# Patient Record
Sex: MALE | Race: WHITE | NOT HISPANIC OR LATINO | Employment: OTHER | URBAN - METROPOLITAN AREA
[De-identification: names, ages, dates, MRNs, and addresses within clinical notes are randomized per-mention and may not be internally consistent; named-entity substitution may affect disease eponyms.]

---

## 2017-01-18 ENCOUNTER — OFFICE VISIT (OUTPATIENT)
Dept: CARDIOLOGY | Facility: CLINIC | Age: 82
End: 2017-01-18

## 2017-01-18 VITALS
HEART RATE: 51 BPM | HEIGHT: 71 IN | SYSTOLIC BLOOD PRESSURE: 120 MMHG | DIASTOLIC BLOOD PRESSURE: 68 MMHG | WEIGHT: 200.2 LBS | BODY MASS INDEX: 28.03 KG/M2

## 2017-01-18 DIAGNOSIS — E78.5 DYSLIPIDEMIA: ICD-10-CM

## 2017-01-18 DIAGNOSIS — I25.10 CORONARY ARTERY DISEASE INVOLVING NATIVE CORONARY ARTERY OF NATIVE HEART WITHOUT ANGINA PECTORIS: Primary | ICD-10-CM

## 2017-01-18 DIAGNOSIS — I10 ESSENTIAL HYPERTENSION: ICD-10-CM

## 2017-01-18 PROCEDURE — 93000 ELECTROCARDIOGRAM COMPLETE: CPT | Performed by: INTERNAL MEDICINE

## 2017-01-18 PROCEDURE — 99213 OFFICE O/P EST LOW 20 MIN: CPT | Performed by: INTERNAL MEDICINE

## 2017-01-18 NOTE — PROGRESS NOTES
Subjective:     Encounter Date:01/18/2017      Patient ID: Nickolas rDake is a 82 y.o. male.    Chief Complaint:  History of Present Illness      The patient is an 82-year-old man with a history of hypertension, dyslipidemia, coronary artery disease status post prior anterolateral ST-elevation myocardial infarction and drug-eluting stent placement of his mid left anterior descending artery who presents for followup.  I saw the patient initially when he presented in 12/2013 with an anterolateral ST elevation myocardial infarction.  His cardiac catheterization showed his mid LAD to have 95% thrombotic stenosis and he underwent a drug-eluting stent placement with a Xience Xpedition 2.5 x 23 mm and 2.5 x 12 mm stents.  His initial echocardiogram after his stent placement revealed septal wall hypokinesis but otherwise preserved left ventricular systolic function.  He had a repeat echocardiogram during his followup which revealed resolution of the wall motion abnormalities.  He was on Brilinta for about a year and stopped this in 01/2015.  Overall he has done well.      Today, he presents for his annual followup.  He continues to feel well overall.  He denies any chest pain, shortness of breath, PND, orthopnea, presyncope or syncope.  He denies any palpitations or lower extremity edema.  He has been compliant with all his medications and has been doing well with them.         Review of Systems   Constitution: Negative for weakness and malaise/fatigue.   HENT: Negative for headaches, hearing loss, hoarse voice, nosebleeds and sore throat.    Eyes: Negative for pain.   Cardiovascular: Negative for chest pain, claudication, cyanosis, dyspnea on exertion, irregular heartbeat, leg swelling, near-syncope, orthopnea, palpitations, paroxysmal nocturnal dyspnea and syncope.   Respiratory: Negative for shortness of breath and snoring.    Endocrine: Negative for cold intolerance, heat intolerance, polydipsia, polyphagia and  polyuria.   Skin: Negative for itching and rash.   Musculoskeletal: Negative for arthritis, falls, joint pain, joint swelling, muscle cramps, muscle weakness and myalgias.   Gastrointestinal: Negative for constipation, diarrhea, dysphagia, heartburn, hematemesis, hematochezia, melena, nausea and vomiting.   Genitourinary: Negative for frequency, hematuria and hesitancy.   Neurological: Negative for excessive daytime sleepiness, dizziness, light-headedness and numbness.   Psychiatric/Behavioral: Negative for depression. The patient is not nervous/anxious.           Current Outpatient Prescriptions:   •  aspirin 81 MG tablet, Take 1 tablet by mouth daily., Disp: , Rfl:   •  atorvastatin (LIPITOR) 20 MG tablet, Take 1 tablet by mouth daily., Disp: , Rfl:   •  metoprolol tartrate (LOPRESSOR) 25 MG tablet, Take 1 tablet by mouth 2 (two) times a day., Disp: , Rfl:   •  Omega-3 Fatty Acids (FISH OIL) 1000 MG capsule capsule, Take 1 capsule by mouth daily., Disp: , Rfl:     Past Medical History   Diagnosis Date   • Atherosclerotic heart disease      s/p anterior ST elevation MI 12/12/2013: mid LAD Xience xpedition 2.5 x 23, 2.5 x 12 mm stent. Proximal LAD 20% stenosis. Mid LCx 20-30% stenosis. Mid RCA 20% stenosis.   • Health care maintenance    • Hypertension    • Ischemic cardiomyopathy    • ST elevation (STEMI) myocardial infarction      History reviewed. No pertinent past surgical history.  Family History   Problem Relation Age of Onset   • No Known Problems Mother    • No Known Problems Father      Social History   Substance Use Topics   • Smoking status: Current Every Day Smoker   • Smokeless tobacco: None      Comment: 4 or less cig/day   • Alcohol use Yes      Comment: rare  / caffeine use           ECG 12 Lead  Date/Time: 1/18/2017 3:13 PM  Performed by: MULUGETA [place]  Authorized by: MULUGETA [place]   Comparison: compared with previous ECG   Similar to previous ECG  Rhythm: sinus bradycardia  Ectopy: atrial  "premature contractions               Objective:         Visit Vitals   • /68   • Pulse 51   • Ht 71\" (180.3 cm)   • Wt 200 lb 3.2 oz (90.8 kg)   • BMI 27.92 kg/m2          Physical Exam   Constitutional: He is oriented to person, place, and time. He appears well-developed and well-nourished.   HENT:   Head: Normocephalic and atraumatic.   Eyes: Conjunctivae, EOM and lids are normal. Pupils are equal, round, and reactive to light.   Neck: Normal range of motion and full passive range of motion without pain. Neck supple. No JVD present. Carotid bruit is not present.   Cardiovascular: Normal rate, regular rhythm, S1 normal and S2 normal.  Exam reveals no gallop.    No murmur heard.  Pulses:       Radial pulses are 2+ on the right side, and 2+ on the left side.   No bilateral lower extremity edema   Pulmonary/Chest: Effort normal and breath sounds normal.   Abdominal: Soft. Normal appearance.   Lymphadenopathy:     He has no cervical adenopathy.   Neurological: He is alert and oriented to person, place, and time.   Skin: Skin is warm, dry and intact.   Psychiatric: He has a normal mood and affect.       Lab Review:       Assessment:          Diagnosis Plan   1. Coronary artery disease involving native coronary artery of native heart without angina pectoris     2. Dyslipidemia     3. Essential hypertension            Plan:           1. Coronary artery disease.  He continues to remain stable and asymptomatic.  Continue his current management including beta blocker, aspirin and statin.   2. Hypertension.  His blood pressures are well controlled on low dose beta blocker. Continue the same.   3. Dyslipidemia.  He is on atorvastatin and his lipids are followed by Dr. Leija.     We will plan to followup with the patient again in one year or sooner if any issues arise prior to that.       Coronary Artery Disease  Assessment  • The patient has no angina    Plan  • Lifestyle modifications discussed include adhering to a " heart healthy diet, avoidance of tobacco products, maintenance of a healthy weight, medication compliance, regular exercise and regular monitoring of cholesterol and blood pressure    Subjective - Objective  • There has been a previous stent procedure using DAYSI 12/2013  • Current antiplatelet therapy includes aspirin 81 mg

## 2018-01-23 ENCOUNTER — OFFICE VISIT (OUTPATIENT)
Dept: CARDIOLOGY | Facility: CLINIC | Age: 83
End: 2018-01-23

## 2018-01-23 VITALS
HEART RATE: 54 BPM | BODY MASS INDEX: 27.44 KG/M2 | SYSTOLIC BLOOD PRESSURE: 98 MMHG | HEIGHT: 71 IN | WEIGHT: 196 LBS | DIASTOLIC BLOOD PRESSURE: 64 MMHG

## 2018-01-23 DIAGNOSIS — I25.10 CAD IN NATIVE ARTERY: Primary | ICD-10-CM

## 2018-01-23 DIAGNOSIS — E78.5 DYSLIPIDEMIA: ICD-10-CM

## 2018-01-23 DIAGNOSIS — I10 ESSENTIAL HYPERTENSION: ICD-10-CM

## 2018-01-23 DIAGNOSIS — E78.5 HYPERLIPIDEMIA, UNSPECIFIED HYPERLIPIDEMIA TYPE: ICD-10-CM

## 2018-01-23 DIAGNOSIS — Z95.5 S/P DRUG ELUTING CORONARY STENT PLACEMENT: ICD-10-CM

## 2018-01-23 PROBLEM — Z72.0 TOBACCO USE: Status: ACTIVE | Noted: 2018-01-23

## 2018-01-23 PROCEDURE — 99213 OFFICE O/P EST LOW 20 MIN: CPT | Performed by: INTERNAL MEDICINE

## 2018-01-23 PROCEDURE — 93000 ELECTROCARDIOGRAM COMPLETE: CPT | Performed by: INTERNAL MEDICINE

## 2018-01-23 NOTE — PROGRESS NOTES
"    Subjective:     Encounter Date:01/23/2018      Patient ID: Nickolas Drake is a 83 y.o. male.    Chief Complaint:  History of Present Illness    This is an 83-year-old with a history of hypertension, hyperlipidemia, coronary artery disease status post prior anterior lateral ST elevation MI and drug eluting stent placement of his mid left anterior descending artery, who presents for follow-up.    I began following the patient when he presented in 12/2013 with an anterolateral ST elevation MI.  His cardiac catheterizations time showed a significant mid LAD thrombotic stenosis of 95% for which he underwent drug-eluting stent placement.  Initially his echocardiogram showed septal wall hypokinesis but preserved left ventricular systolic function.  A repeat echocardiogram and follow-up showed resolution of his wall motion abnormalities and continued normal left ventricular systolic function.  He remained on Ticagrelor approximately one year following his stent placement.  In follow-up he is stent well.    Today he presents for annual follow-up.  He continues to feel well.  He denies any chest pain, shortness of breath, PND or orthopnea, presyncope or syncope, palpitations, or lower extremity edema.  He remains compliant with all his medications.  His lab work is followed routinely by Dr. Leija.  He unfortunately continues to smoke about 4-5 cigarettes a day.  When asked about quitting the patient stated \"what else does he have to do\".  He has however placed rolls himself where he cannot smoke inside his house or car and admits that the recent bad weather has limited the amount that he can smoke.    Review of Systems   Constitution: Negative for weakness and malaise/fatigue.   HENT: Negative for hearing loss, hoarse voice, nosebleeds and sore throat.    Eyes: Negative for pain.   Cardiovascular: Negative for chest pain, claudication, cyanosis, dyspnea on exertion, irregular heartbeat, leg swelling, near-syncope, " orthopnea, palpitations, paroxysmal nocturnal dyspnea and syncope.   Respiratory: Negative for shortness of breath and snoring.    Endocrine: Negative for cold intolerance, heat intolerance, polydipsia, polyphagia and polyuria.   Skin: Negative for itching and rash.   Musculoskeletal: Negative for arthritis, falls, joint pain, joint swelling, muscle cramps, muscle weakness and myalgias.   Gastrointestinal: Negative for constipation, diarrhea, dysphagia, heartburn, hematemesis, hematochezia, melena, nausea and vomiting.   Genitourinary: Negative for frequency, hematuria and hesitancy.   Neurological: Negative for excessive daytime sleepiness, dizziness, headaches, light-headedness and numbness.   Psychiatric/Behavioral: Negative for depression. The patient is not nervous/anxious.           Current Outpatient Prescriptions:   •  aspirin 81 MG tablet, Take 1 tablet by mouth daily., Disp: , Rfl:   •  atorvastatin (LIPITOR) 20 MG tablet, Take 1 tablet by mouth daily., Disp: , Rfl:   •  metoprolol tartrate (LOPRESSOR) 25 MG tablet, Take 1 tablet by mouth 2 (two) times a day., Disp: , Rfl:   •  Omega-3 Fatty Acids (FISH OIL) 1000 MG capsule capsule, Take 1 capsule by mouth daily., Disp: , Rfl:     Past Medical History:   Diagnosis Date   • Atherosclerotic heart disease     s/p anterior ST elevation MI 12/12/2013: mid LAD Xience xpedition 2.5 x 23, 2.5 x 12 mm stent. Proximal LAD 20% stenosis. Mid LCx 20-30% stenosis. Mid RCA 20% stenosis.   • Health care maintenance    • Hypertension    • Ischemic cardiomyopathy    • ST elevation (STEMI) myocardial infarction      History reviewed. No pertinent surgical history.     Family History   Problem Relation Age of Onset   • No Known Problems Mother    • No Known Problems Father      Social History   Substance Use Topics   • Smoking status: Current Every Day Smoker   • Smokeless tobacco: None      Comment: 4 or less cig/day   • Alcohol use Yes      Comment: rare  / caffeine use  "          ECG 12 Lead  Date/Time: 1/23/2018 5:13 PM  Performed by: MULUGETA [place]  Authorized by: MULUGETA [place]   Comparison: compared with previous ECG   Similar to previous ECG  Rhythm: sinus rhythm  Ectopy: atrial premature contractions               Objective:         Visit Vitals   • BP 98/64 (BP Location: Right arm, Patient Position: Sitting)   • Pulse 54   • Ht 180.3 cm (71\")   • Wt 88.9 kg (196 lb)   • BMI 27.34 kg/m2          Physical Exam   Constitutional: He is oriented to person, place, and time. He appears well-developed and well-nourished.   HENT:   Head: Normocephalic and atraumatic.   Eyes: Conjunctivae, EOM and lids are normal. Pupils are equal, round, and reactive to light.   Neck: Normal range of motion and full passive range of motion without pain. Neck supple. No JVD present. Carotid bruit is not present.   Cardiovascular: Normal rate, regular rhythm, S1 normal and S2 normal.  Exam reveals no gallop.    No murmur heard.  Pulses:       Radial pulses are 2+ on the right side, and 2+ on the left side.   No bilateral lower extremity edema   Pulmonary/Chest: Effort normal and breath sounds normal.   Abdominal: Soft. Normal appearance.   Lymphadenopathy:     He has no cervical adenopathy.   Neurological: He is alert and oriented to person, place, and time.   Skin: Skin is warm, dry and intact.   Psychiatric: He has a normal mood and affect.       Lab Review:       Assessment:          Diagnosis Plan   1. CAD in native artery     2. Essential hypertension     3. Dyslipidemia     4. S/P drug eluting coronary stent placement     5. Hyperlipidemia, unspecified hyperlipidemia type            Plan:       1.  Coronary artery disease.  He is status post prior anterolateral myocardial infarction and drug-eluting stent placement of this mid LAD.  He remained stable and asymptomatic.  Will continue his current medical management beta blocker, aspirin, statin.  2.  Hypertension.  Blood pressures are relatively " limited in the office but he appears asymptomatic.  Continue to monitor this on his low-dose beta blocker.  3.  Hyperlipidemia.  On atorvastatin which is followed by Dr. Leija.  4.  Tobacco use.  Discussed smoking cessation with the patient.    Will plan into the patient back again in one year or sooner further issues arise.    Coronary Artery Disease  Assessment  • The patient has no angina    Plan  • Lifestyle modifications discussed include adhering to a heart healthy diet, avoidance of tobacco products, maintenance of a healthy weight, medication compliance, regular exercise and regular monitoring of cholesterol and blood pressure    Subjective - Objective  • There is a history of past MI 12/2013  • There has been a previous stent procedure using DAYSI 12/2013  • Current antiplatelet therapy includes aspirin 81 mg

## 2019-01-23 ENCOUNTER — OFFICE VISIT (OUTPATIENT)
Dept: CARDIOLOGY | Facility: CLINIC | Age: 84
End: 2019-01-23

## 2019-01-23 VITALS
HEART RATE: 67 BPM | SYSTOLIC BLOOD PRESSURE: 122 MMHG | WEIGHT: 191 LBS | DIASTOLIC BLOOD PRESSURE: 78 MMHG | HEIGHT: 71 IN | BODY MASS INDEX: 26.74 KG/M2

## 2019-01-23 DIAGNOSIS — E78.5 DYSLIPIDEMIA: ICD-10-CM

## 2019-01-23 DIAGNOSIS — Z72.0 TOBACCO USE: ICD-10-CM

## 2019-01-23 DIAGNOSIS — I25.10 CORONARY ARTERY DISEASE INVOLVING NATIVE CORONARY ARTERY OF NATIVE HEART WITHOUT ANGINA PECTORIS: Primary | ICD-10-CM

## 2019-01-23 DIAGNOSIS — Z95.5 S/P DRUG ELUTING CORONARY STENT PLACEMENT: ICD-10-CM

## 2019-01-23 DIAGNOSIS — I10 ESSENTIAL HYPERTENSION: ICD-10-CM

## 2019-01-23 PROCEDURE — 99213 OFFICE O/P EST LOW 20 MIN: CPT | Performed by: INTERNAL MEDICINE

## 2019-01-23 PROCEDURE — 93000 ELECTROCARDIOGRAM COMPLETE: CPT | Performed by: INTERNAL MEDICINE

## 2019-03-18 ENCOUNTER — APPOINTMENT (OUTPATIENT)
Dept: CT IMAGING | Facility: HOSPITAL | Age: 84
End: 2019-03-18

## 2019-03-18 ENCOUNTER — HOSPITAL ENCOUNTER (EMERGENCY)
Facility: HOSPITAL | Age: 84
Discharge: HOME | End: 2019-03-18
Attending: EMERGENCY MEDICINE | Admitting: EMERGENCY MEDICINE

## 2019-03-18 ENCOUNTER — APPOINTMENT (OUTPATIENT)
Dept: GENERAL RADIOLOGY | Facility: HOSPITAL | Age: 84
End: 2019-03-18

## 2019-03-18 VITALS
DIASTOLIC BLOOD PRESSURE: 79 MMHG | TEMPERATURE: 97 F | HEIGHT: 72 IN | SYSTOLIC BLOOD PRESSURE: 131 MMHG | OXYGEN SATURATION: 99 % | BODY MASS INDEX: 25.87 KG/M2 | WEIGHT: 191 LBS | RESPIRATION RATE: 18 BRPM | HEART RATE: 50 BPM

## 2019-03-18 DIAGNOSIS — R55 NEAR SYNCOPE: Primary | ICD-10-CM

## 2019-03-18 DIAGNOSIS — R79.89 ELEVATED SERUM CREATININE: ICD-10-CM

## 2019-03-18 LAB
ALBUMIN SERPL-MCNC: 3.7 G/DL (ref 3.5–5.2)
ALBUMIN/GLOB SERPL: 1.1 G/DL
ALP SERPL-CCNC: 123 U/L (ref 39–117)
ALT SERPL W P-5'-P-CCNC: 18 U/L (ref 1–41)
ANION GAP SERPL CALCULATED.3IONS-SCNC: 11.9 MMOL/L
AST SERPL-CCNC: 27 U/L (ref 1–40)
BASOPHILS # BLD AUTO: 0.17 10*3/MM3 (ref 0–0.2)
BASOPHILS NFR BLD AUTO: 1.6 % (ref 0–1.5)
BILIRUB SERPL-MCNC: 0.3 MG/DL (ref 0.2–1.2)
BILIRUB UR QL STRIP: NEGATIVE
BUN BLD-MCNC: 12 MG/DL (ref 8–23)
BUN/CREAT SERPL: 8.8 (ref 7–25)
CALCIUM SPEC-SCNC: 8.9 MG/DL (ref 8.6–10.5)
CHLORIDE SERPL-SCNC: 100 MMOL/L (ref 98–107)
CLARITY UR: CLEAR
CO2 SERPL-SCNC: 28.1 MMOL/L (ref 22–29)
COLOR UR: YELLOW
CREAT BLD-MCNC: 1.36 MG/DL (ref 0.76–1.27)
DEPRECATED RDW RBC AUTO: 48.9 FL (ref 37–54)
EOSINOPHIL # BLD AUTO: 0.64 10*3/MM3 (ref 0–0.4)
EOSINOPHIL NFR BLD AUTO: 5.9 % (ref 0.3–6.2)
ERYTHROCYTE [DISTWIDTH] IN BLOOD BY AUTOMATED COUNT: 13.2 % (ref 12.3–15.4)
GFR SERPL CREATININE-BSD FRML MDRD: 50 ML/MIN/1.73
GLOBULIN UR ELPH-MCNC: 3.3 GM/DL
GLUCOSE BLD-MCNC: 99 MG/DL (ref 65–99)
GLUCOSE UR STRIP-MCNC: NEGATIVE MG/DL
HCT VFR BLD AUTO: 45.2 % (ref 37.5–51)
HGB BLD-MCNC: 14.5 G/DL (ref 13–17.7)
HGB UR QL STRIP.AUTO: NEGATIVE
HOLD SPECIMEN: NORMAL
HOLD SPECIMEN: NORMAL
IMM GRANULOCYTES # BLD AUTO: 0.05 10*3/MM3 (ref 0–0.05)
IMM GRANULOCYTES NFR BLD AUTO: 0.5 % (ref 0–0.5)
KETONES UR QL STRIP: NEGATIVE
LEUKOCYTE ESTERASE UR QL STRIP.AUTO: NEGATIVE
LYMPHOCYTES # BLD AUTO: 3.22 10*3/MM3 (ref 0.7–3.1)
LYMPHOCYTES NFR BLD AUTO: 29.9 % (ref 19.6–45.3)
MCH RBC QN AUTO: 32.2 PG (ref 26.6–33)
MCHC RBC AUTO-ENTMCNC: 32.1 G/DL (ref 31.5–35.7)
MCV RBC AUTO: 100.2 FL (ref 79–97)
MONOCYTES # BLD AUTO: 1.03 10*3/MM3 (ref 0.1–0.9)
MONOCYTES NFR BLD AUTO: 9.6 % (ref 5–12)
NEUTROPHILS # BLD AUTO: 5.65 10*3/MM3 (ref 1.4–7)
NEUTROPHILS NFR BLD AUTO: 52.5 % (ref 42.7–76)
NITRITE UR QL STRIP: NEGATIVE
NRBC BLD AUTO-RTO: 0 /100 WBC (ref 0–0)
PH UR STRIP.AUTO: <=5 [PH] (ref 5–8)
PLATELET # BLD AUTO: 289 10*3/MM3 (ref 140–450)
PMV BLD AUTO: 9.8 FL (ref 6–12)
POTASSIUM BLD-SCNC: 3.5 MMOL/L (ref 3.5–5.2)
PROT SERPL-MCNC: 7 G/DL (ref 6–8.5)
PROT UR QL STRIP: NEGATIVE
RBC # BLD AUTO: 4.51 10*6/MM3 (ref 4.14–5.8)
SODIUM BLD-SCNC: 140 MMOL/L (ref 136–145)
SP GR UR STRIP: 1.02 (ref 1–1.03)
TROPONIN T SERPL-MCNC: <0.01 NG/ML (ref 0–0.03)
UROBILINOGEN UR QL STRIP: NORMAL
WBC NRBC COR # BLD: 10.76 10*3/MM3 (ref 3.4–10.8)
WHOLE BLOOD HOLD SPECIMEN: NORMAL
WHOLE BLOOD HOLD SPECIMEN: NORMAL

## 2019-03-18 PROCEDURE — 93005 ELECTROCARDIOGRAM TRACING: CPT | Performed by: PHYSICIAN ASSISTANT

## 2019-03-18 PROCEDURE — 80053 COMPREHEN METABOLIC PANEL: CPT | Performed by: PHYSICIAN ASSISTANT

## 2019-03-18 PROCEDURE — 84484 ASSAY OF TROPONIN QUANT: CPT | Performed by: PHYSICIAN ASSISTANT

## 2019-03-18 PROCEDURE — 81003 URINALYSIS AUTO W/O SCOPE: CPT | Performed by: PHYSICIAN ASSISTANT

## 2019-03-18 PROCEDURE — 93005 ELECTROCARDIOGRAM TRACING: CPT

## 2019-03-18 PROCEDURE — 85025 COMPLETE CBC W/AUTO DIFF WBC: CPT | Performed by: PHYSICIAN ASSISTANT

## 2019-03-18 PROCEDURE — 71045 X-RAY EXAM CHEST 1 VIEW: CPT

## 2019-03-18 PROCEDURE — 99284 EMERGENCY DEPT VISIT MOD MDM: CPT

## 2019-03-18 PROCEDURE — 70450 CT HEAD/BRAIN W/O DYE: CPT

## 2019-03-18 PROCEDURE — 93005 ELECTROCARDIOGRAM TRACING: CPT | Performed by: EMERGENCY MEDICINE

## 2019-03-18 PROCEDURE — 93010 ELECTROCARDIOGRAM REPORT: CPT | Performed by: INTERNAL MEDICINE

## 2019-03-18 RX ORDER — SODIUM CHLORIDE 0.9 % (FLUSH) 0.9 %
10 SYRINGE (ML) INJECTION AS NEEDED
Status: DISCONTINUED | OUTPATIENT
Start: 2019-03-18 | End: 2019-03-18 | Stop reason: HOSPADM

## 2019-03-18 NOTE — ED PROVIDER NOTES
MD ATTESTATION NOTE    The SCOOBY and I have discussed this patient's history, physical exam, and treatment plan.  I have reviewed the documentation and personally had a face to face interaction with the patient. I affirm the documentation and agree with the treatment and plan.  The attached note describes my personal findings.    Pt with episodic generalized weakness that began 1 week ago. He reports a prodrome and knows that he is going to fall. Pt has had 3x falls.     On exam, heart is regular rhythm, but bradycardic. Lungs are CTAB.    Will discharge and have monitor his BP and pulse. Will have follow-up with cardiology.    Documentation assistance provided by liliya Maxwell for Dr Felipe.  Information recorded by the scribe was done at my direction and has been verified and validated by me.           Armando Maxwell  03/18/19 2002       Inocencio Felipe MD  03/18/19 3201

## 2019-03-18 NOTE — ED PROVIDER NOTES
"EMERGENCY DEPARTMENT ENCOUNTER    Room Number:  40/40  Date seen:  3/18/2019  Time seen: 4:23 PM  PCP: Carmela Leija MD  Historian: Patient, Family      HPI:  Chief Complaint: Multiple Falls  Context: Nickolas Drake is an 84 y.o. male who presents to the ED c/o intermittent episodes of falls onset about a week ago. Pt reports that he has had 3 episodes of dizziness. Before pt's first episode, pt was walking in the garage and \"knew that I was going to fall\" and abruptly fell. Before pt's second episode of fall, while pt was standing in the restroom, \"[pt] felt like I was going to fall\"; therefore, pt sat on the commode and symptoms resolved. Before pt's third episode of fall, while pt was leaning over on the couch, pt once again abruptly fell. Pt reports, \"I don't think I'm losing consciousness [during these falls]; however, I just can't seem to control my body\". Pt reports that he has also had intermittent episodes of dizziness described as \"spinning like a top\" only when he rolls over in bed for the past week. Furthermore, pt has sustained a head injury during one of these falls. Pt denies focal weakness/numbness, speech/visual difficulties, chest pain, dyspnea, palpitations, abdominal pain, N/V/D, bloody stools, headache, neck pain/stiffness, and tinnitus. Pt reports that he was evaluated for this at his PMD's office last week and was established with a home health RN. There are no other complaints at this time.     Onset: Abrupt in onset  Location: Generalized body  Radiation: N/A  Duration: Onset about a week ago  Timing: Intermittent  Character: \"falls\"  Aggravating Factors: Nothing  Alleviating Factors: Nothing  Severity: Moderate        ALLERGIES  Other    PAST MEDICAL HISTORY  Active Ambulatory Problems     Diagnosis Date Noted   • Dyslipidemia 01/19/2016   • CAD in native artery 01/19/2016   • Essential hypertension 01/19/2016   • Coronary artery disease involving native coronary artery of " native heart without angina pectoris 01/20/2016   • Hyperlipemia 01/20/2016   • S/P drug eluting coronary stent placement 01/23/2018   • Tobacco use 01/23/2018     Resolved Ambulatory Problems     Diagnosis Date Noted   • No Resolved Ambulatory Problems     Past Medical History:   Diagnosis Date   • Atherosclerotic heart disease    • CAD (coronary artery disease)    • Dyslipidemia    • Health care maintenance    • Hyperlipidemia    • Hypertension    • Ischemic cardiomyopathy    • ST elevation (STEMI) myocardial infarction (CMS/HCC)        PAST SURGICAL HISTORY  Past Surgical History:   Procedure Laterality Date   • CARDIAC CATHETERIZATION Left 12/12/2013    [facility], selective coronary angiogram, PCI stent of mid LAD, left ventricular angiogram, Dr. Janet Snowden       FAMILY HISTORY  Family History   Problem Relation Age of Onset   • No Known Problems Mother    • No Known Problems Father        SOCIAL HISTORY  Social History     Socioeconomic History   • Marital status:      Spouse name: Not on file   • Number of children: Not on file   • Years of education: Not on file   • Highest education level: Not on file   Social Needs   • Financial resource strain: Not on file   • Food insecurity - worry: Not on file   • Food insecurity - inability: Not on file   • Transportation needs - medical: Not on file   • Transportation needs - non-medical: Not on file   Occupational History   • Not on file   Tobacco Use   • Smoking status: Current Every Day Smoker   • Smokeless tobacco: Never Used   • Tobacco comment: 4 or less cig/day   Substance and Sexual Activity   • Alcohol use: No     Frequency: Never     Comment: rare  / caffeine use   • Drug use: No   • Sexual activity: Defer   Other Topics Concern   • Not on file   Social History Narrative   • Not on file           REVIEW OF SYSTEMS  Review of Systems   Constitutional: Negative for chills.   HENT: Negative for congestion, rhinorrhea, sore throat and  "tinnitus.    Eyes: Negative for pain and visual disturbance.   Respiratory: Negative for cough and shortness of breath.    Cardiovascular: Negative for chest pain, palpitations and leg swelling.   Gastrointestinal: Negative for abdominal pain, blood in stool, diarrhea, nausea and vomiting.   Genitourinary: Negative for difficulty urinating, dysuria, flank pain and frequency.   Musculoskeletal: Negative for neck pain and neck stiffness.        Head injury   Skin: Negative for rash.   Neurological: Positive for dizziness (\"sensation of motion\"). Negative for speech difficulty, weakness, light-headedness, numbness and headaches.   Psychiatric/Behavioral: Negative.    All other systems reviewed and are negative.          PHYSICAL EXAM  ED Triage Vitals   Temp Heart Rate Resp BP SpO2   03/18/19 1440 03/18/19 1440 03/18/19 1440 03/18/19 1450 03/18/19 1440   97 °F (36.1 °C) (!) 41 14 127/79 97 %      Temp src Heart Rate Source Patient Position BP Location FiO2 (%)   -- 03/18/19 1440 03/18/19 1450 -- --    Monitor Lying       Physical Exam   Constitutional: He is oriented to person, place, and time. No distress.   HENT:   Right Ear: Tympanic membrane normal.   Left Ear: Tympanic membrane normal.   Mouth/Throat: Mucous membranes are normal.   Eyes: EOM are normal. Pupils are equal, round, and reactive to light.   Neck: Normal range of motion. Neck supple.   Cardiovascular: Normal rate, regular rhythm and normal heart sounds.   Pulmonary/Chest: Effort normal and breath sounds normal. No respiratory distress. He has no decreased breath sounds. He has no wheezes. He has no rhonchi. He has no rales.   Abdominal: Soft. There is no tenderness. There is no rebound and no guarding.   Musculoskeletal: Normal range of motion.   Neurological: He is alert and oriented to person, place, and time. He has normal sensation.   No facial droop. There is no dysarthria, aphasia, or slurred speech. Sensation is intact to light touch bilaterally " and is symmetrical in the face, BUEs, and BLEs. Strength is 5/5 and symmetrical in the BUEs and BLEs. Finger to nose and heel to shin are intact bilaterally.    Skin: Skin is warm and dry. Ecchymosis (to the right forehead and right periorbital region - appears to be healing) noted.   Psychiatric: Mood and affect normal.   Nursing note and vitals reviewed.          LAB RESULTS  Recent Results (from the past 24 hour(s))   Light Blue Top    Collection Time: 03/18/19  4:13 PM   Result Value Ref Range    Extra Tube hold for add-on    Green Top (Gel)    Collection Time: 03/18/19  4:13 PM   Result Value Ref Range    Extra Tube Hold for add-ons.    Lavender Top    Collection Time: 03/18/19  4:13 PM   Result Value Ref Range    Extra Tube hold for add-on    Gold Top - SST    Collection Time: 03/18/19  4:13 PM   Result Value Ref Range    Extra Tube Hold for add-ons.    Comprehensive Metabolic Panel    Collection Time: 03/18/19  4:13 PM   Result Value Ref Range    Glucose 99 65 - 99 mg/dL    BUN 12 8 - 23 mg/dL    Creatinine 1.36 (H) 0.76 - 1.27 mg/dL    Sodium 140 136 - 145 mmol/L    Potassium 3.5 3.5 - 5.2 mmol/L    Chloride 100 98 - 107 mmol/L    CO2 28.1 22.0 - 29.0 mmol/L    Calcium 8.9 8.6 - 10.5 mg/dL    Total Protein 7.0 6.0 - 8.5 g/dL    Albumin 3.70 3.50 - 5.20 g/dL    ALT (SGPT) 18 1 - 41 U/L    AST (SGOT) 27 1 - 40 U/L    Alkaline Phosphatase 123 (H) 39 - 117 U/L    Total Bilirubin 0.3 0.2 - 1.2 mg/dL    eGFR Non African Amer 50 (L) >60 mL/min/1.73    Globulin 3.3 gm/dL    A/G Ratio 1.1 g/dL    BUN/Creatinine Ratio 8.8 7.0 - 25.0    Anion Gap 11.9 mmol/L   Troponin    Collection Time: 03/18/19  4:13 PM   Result Value Ref Range    Troponin T <0.010 0.000 - 0.030 ng/mL   CBC Auto Differential    Collection Time: 03/18/19  4:13 PM   Result Value Ref Range    WBC 10.76 3.40 - 10.80 10*3/mm3    RBC 4.51 4.14 - 5.80 10*6/mm3    Hemoglobin 14.5 13.0 - 17.7 g/dL    Hematocrit 45.2 37.5 - 51.0 %    .2 (H) 79.0 - 97.0  fL    MCH 32.2 26.6 - 33.0 pg    MCHC 32.1 31.5 - 35.7 g/dL    RDW 13.2 12.3 - 15.4 %    RDW-SD 48.9 37.0 - 54.0 fl    MPV 9.8 6.0 - 12.0 fL    Platelets 289 140 - 450 10*3/mm3    Neutrophil % 52.5 42.7 - 76.0 %    Lymphocyte % 29.9 19.6 - 45.3 %    Monocyte % 9.6 5.0 - 12.0 %    Eosinophil % 5.9 0.3 - 6.2 %    Basophil % 1.6 (H) 0.0 - 1.5 %    Immature Grans % 0.5 0.0 - 0.5 %    Neutrophils, Absolute 5.65 1.40 - 7.00 10*3/mm3    Lymphocytes, Absolute 3.22 (H) 0.70 - 3.10 10*3/mm3    Monocytes, Absolute 1.03 (H) 0.10 - 0.90 10*3/mm3    Eosinophils, Absolute 0.64 (H) 0.00 - 0.40 10*3/mm3    Basophils, Absolute 0.17 0.00 - 0.20 10*3/mm3    Immature Grans, Absolute 0.05 0.00 - 0.05 10*3/mm3    nRBC 0.0 0.0 - 0.0 /100 WBC       I ordered the above labs and reviewed the results.        RADIOLOGY  CT Head Without Contrast   Preliminary Result   1. No acute intracranial process identified.   2. Mild sinusitis.       Radiation dose reduction techniques were utilized, including automated   exposure control and exposure modulation based on body size.              XR Chest 1 View   Preliminary Result   Patchy increased density in the left lung base could   represent chronic parenchymal change versus acute pneumonia. Please   correlate with the clinical findings. Followup films recommended.              I ordered the above noted radiological studies and reviewed the images on the PACS system.          MEDICATIONS GIVEN IN ER  Medications   sodium chloride 0.9 % flush 10 mL (not administered)           EKG    EKG          EKG time: 14:46  Rhythm/Rate: NSR rate 67  Bigemy  P waves and OR: Normal ESTELA  QRS, axis: Normal QRS   ST and T waves: No ST elevation     Interpreted Contemporaneously by me, independently viewed  Improved compared to prior 12/15/13          PROCEDURES  Procedures          PROGRESS AND CONSULTS    Progress Notes:         5:01 PM:  Blood work, CXR, CT Head, and EKG ordered for further evaluation. Pt is not a  "candidate for tPA as pt's symptoms began about a week ago.     7:50 PM:  Reviewed pt's history and workup with Dr. Felipe (ER physician).  After a bedside evaluation, Dr. Felipe agrees with the plan of care.    8:04 PM:  Rechecked pt. Pt is resting comfortably and appears in no acute distress. Informed pt that his creatinine is 1.36. Troponin is negative. Hgb is WNL. Pt's CT Head is negative acute. Informed pt that he has had several episodes of bradycardia in the ER in which pt's HR has been the 40s-50s; currently, pt's HR is in the 70s. Pt was informed of the limitations of the studies ordered in the ER. We cannot r/o all possible causes of pt's symptoms in the ER but symptoms could be related to bradycardia. Pt reports that he understands this and would like to f/u as an outpatient for his symptoms. Pt was strongly advised to f/u with his cardiologist in the office and to have his creatinine rechecked in his PMD's office. Pt was also advised to decrease his Metoprolol dose to 12.5 mg twice daily, as this may be related to these episodes and bradycardia, and to monitor his heart rate and blood pressure twice daily for the next 5 days. Strict RTER warnings given. Pt agrees with plan for discharge.         Disposition vitals:  /79 (Patient Position: Lying)   Pulse 50   Temp 97 °F (36.1 °C)   Resp 18   Ht 182.9 cm (72\")   Wt 86.6 kg (191 lb)   SpO2 99%   BMI 25.90 kg/m²           DIAGNOSIS  Final diagnoses:   Near syncope   Elevated serum creatinine           DISPOSITION  Pt discharged.    DISCHARGE    Patient discharged in stable condition.    Reviewed implications of results, diagnosis, meds, responsibility to follow up, warning signs and symptoms of possible worsening, potential complications and reasons to return to ER.    Patient/Family voiced understanding of above instructions.    Discussed plan for discharge, as there is no emergent indication for admission. Pt/family is agreeable and understands " need for follow up and repeat testing. Pt is aware that discharge does not mean that nothing is wrong but it indicates no emergency is present that requires admission and they must continue care with follow-up as given below or physician of their choice.     FOLLOW-UP  Janet Snowden MD  [id number]  [place] [id number]  [phone]    Schedule an appointment as soon as possible for a visit            Medication List      Changed    metoprolol tartrate 25 MG tablet  Commonly known as:  LOPRESSOR  Take 0.5 tablets by mouth 2 (Two) Times a Day.  What changed:    how much to take  when to take this                Documentation assistance provided by liliya Peng for Ms. Kathy Ibarra PA-C.  Information recorded by the scribe was done at my direction and has been verified and validated by me.             Wai Peng  03/18/19 2021       Kathy Ibarra PA  03/18/19 [id number]

## 2019-03-19 NOTE — DISCHARGE INSTRUCTIONS
Decrease your metoprolol dose to 12.5mg 2 times a day (1/2 tablet twice a day)  Follow up with Dr. Snowden within 1 week. Check your blood pressure and heart rate twice a day for the next 5 days and record it to take to this visit.   You should have your kidney function rechecked in 1-2 weeks. Stay well-hydrated.  Return to the ER for recurrent symptoms, chest pain, shortness of breath, new or worsening symptoms, any concerns.

## 2019-04-03 ENCOUNTER — OFFICE VISIT (OUTPATIENT)
Dept: CARDIOLOGY | Facility: CLINIC | Age: 84
End: 2019-04-03

## 2019-04-03 VITALS
DIASTOLIC BLOOD PRESSURE: 62 MMHG | SYSTOLIC BLOOD PRESSURE: 118 MMHG | WEIGHT: 188 LBS | HEIGHT: 71 IN | BODY MASS INDEX: 26.32 KG/M2 | HEART RATE: 61 BPM

## 2019-04-03 DIAGNOSIS — R55 SYNCOPE AND COLLAPSE: ICD-10-CM

## 2019-04-03 DIAGNOSIS — E78.5 HYPERLIPIDEMIA, UNSPECIFIED HYPERLIPIDEMIA TYPE: ICD-10-CM
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
## 2019-04-03 DIAGNOSIS — R42 LIGHTHEADEDNESS: ICD-10-CM

## 2019-04-03 DIAGNOSIS — Z72.0 TOBACCO USE: ICD-10-CM

## 2019-04-03 DIAGNOSIS — I10 ESSENTIAL HYPERTENSION: ICD-10-CM

## 2019-04-03 DIAGNOSIS — I25.10 CORONARY ARTERY DISEASE INVOLVING NATIVE CORONARY ARTERY OF NATIVE HEART WITHOUT ANGINA PECTORIS: Primary | ICD-10-CM

## 2019-04-03 DIAGNOSIS — R00.1 BRADYCARDIA, SINUS: ICD-10-CM

## 2019-04-03 DIAGNOSIS — Z95.5 S/P DRUG ELUTING CORONARY STENT PLACEMENT: ICD-10-CM
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
## 2019-04-03 PROCEDURE — 93000 ELECTROCARDIOGRAM COMPLETE: CPT | Performed by: INTERNAL MEDICINE

## 2019-04-03 PROCEDURE — 99214 OFFICE O/P EST MOD 30 MIN: CPT | Performed by: INTERNAL MEDICINE

## 2019-04-03 NOTE — PROGRESS NOTES
Subjective:     Encounter Date:04/03/2019      Patient ID: Nickolas Drake is a 84 y.o. male.    Chief Complaint:  History of Present Illness    This is an 84-year-old with a history of hypertension, hyperlipidemia, tobacco use, coronary artery disease status post prior anterior lateral ST elevation MI and drug eluting stent placement of his mid left anterior descending artery, who presents for follow-up.     I began following the patient when he presented in 12/2013 with an anterolateral ST elevation MI.  His cardiac catheterizations time showed a significant mid LAD thrombotic stenosis of 95% for which he underwent drug-eluting stent placement.  Initially his echocardiogram showed septal wall hypokinesis but preserved left ventricular systolic function.  A repeat echocardiogram and follow-up showed resolution of his wall motion abnormalities and continued normal left ventricular systolic function.  He remained on Ticagrelor approximately one year following his stent placement.  He has continued to smoke 4-5 cigarettes since then and has not expressed any interest in quitting.      Saw him last in 1/2019 at which time he was doing well.  He did not make any changes to his management and the plan was to see him back in a year.  Today he presents for ER follow-up.  He was seen in the emergency room on 3/18/2019 after a total of 3 falls the first associated with possible syncope.  The patient reports that the first episode he fell to the floor and had no preceding symptoms.  He reports that it happened so quickly was unable to stop it.  Not clear if he lost consciousness.  The other 2 episodes resulted in falling but no loss of consciousness.  He reports that these episodes were preceded by lightheadedness.  In the emergency room his workup was unremarkable including imaging of his head.  Apparently he was noted to be bradycardic with heart rates in the 40s-50s for periods of time.  For this reason they  recommended that he decrease his metoprolol tartrate to 12.5 mg twice a day.  He denies any symptoms similar to this prior to these episodes or since then.  He has been tolerating the lower dose of the metoprolol.  His heart rates have been running in the 60s-70s.  His blood pressures have been well controlled with systolics in the 100s-120s.  He denies any chest pain, shortness of breath, PND orthopnea, lower extremity edema, or palpitations.    Review of Systems   Constitution: Negative for weakness and malaise/fatigue.   HENT: Negative for hearing loss, hoarse voice, nosebleeds and sore throat.    Eyes: Negative for pain.   Cardiovascular: Positive for syncope. Negative for chest pain, claudication, cyanosis, dyspnea on exertion, irregular heartbeat, leg swelling, near-syncope, orthopnea, palpitations and paroxysmal nocturnal dyspnea.   Respiratory: Negative for shortness of breath and snoring.    Endocrine: Negative for cold intolerance, heat intolerance, polydipsia, polyphagia and polyuria.   Skin: Negative for itching and rash.   Musculoskeletal: Positive for falls. Negative for arthritis, joint pain, joint swelling, muscle cramps, muscle weakness and myalgias.   Gastrointestinal: Negative for constipation, diarrhea, dysphagia, heartburn, hematemesis, hematochezia, melena, nausea and vomiting.   Genitourinary: Negative for frequency, hematuria and hesitancy.   Neurological: Positive for dizziness. Negative for excessive daytime sleepiness, headaches, light-headedness and numbness.   Psychiatric/Behavioral: Negative for depression. The patient is not nervous/anxious.           Current Outpatient Medications:   •  aspirin 81 MG tablet, Take 1 tablet by mouth daily., Disp: , Rfl:   •  atorvastatin (LIPITOR) 20 MG tablet, Take 1 tablet by mouth daily., Disp: , Rfl:   •  metoprolol tartrate (LOPRESSOR) 25 MG tablet, Take 0.5 tablets by mouth 2 (Two) Times a Day., Disp: 10 tablet, Rfl: 0  •  Omega-3 Fatty Acids  "(FISH OIL) 1000 MG capsule capsule, Take 1 capsule by mouth daily., Disp: , Rfl:     Past Medical History:   Diagnosis Date   • Atherosclerotic heart disease     s/p anterior ST elevation MI 12/12/2013: mid LAD Xience xpedition 2.5 x 23, 2.5 x 12 mm stent. Proximal LAD 20% stenosis. Mid LCx 20-30% stenosis. Mid RCA 20% stenosis.   • CAD (coronary artery disease)    • Dyslipidemia    • Health care maintenance    • Hyperlipidemia    • Hypertension    • Ischemic cardiomyopathy    • Near syncope    • ST elevation (STEMI) myocardial infarction (CMS/HCC)      Past Surgical History:   Procedure Laterality Date   • CARDIAC CATHETERIZATION Left 12/12/2013    [facility], selective coronary angiogram, PCI stent of mid LAD, left ventricular angiogram, Dr. Janet Snowden     Family History   Problem Relation Age of Onset   • No Known Problems Mother    • No Known Problems Father      Social History     Tobacco Use   • Smoking status: Current Every Day Smoker   • Smokeless tobacco: Never Used   • Tobacco comment: 4 or less cig/day   Substance Use Topics   • Alcohol use: No     Frequency: Never     Comment: rare  / caffeine use   • Drug use: No           ECG 12 Lead  Date/Time: 4/3/2019 4:18 PM  Performed by: Janet Snowden MD  Authorized by: Janet Snowden MD   Comparison: compared with previous ECG   Similar to previous ECG  Rhythm: sinus rhythm  Comments: Ectopic atrial beats               Objective:         Visit Vitals  /62 (BP Location: Right arm, Patient Position: Sitting, Cuff Size: Adult)   Pulse 61   Ht 180.3 cm (71\")   Wt 85.3 kg (188 lb)   BMI 26.22 kg/m²          Physical Exam   Constitutional: He is oriented to person, place, and time. He appears well-developed and well-nourished.   HENT:   Head: Normocephalic and atraumatic.   Neck: No JVD present. Carotid bruit is not present.   Cardiovascular: Normal rate, regular rhythm, S1 normal and S2 normal. Exam reveals no gallop.   No murmur " heard.  Pulses:       Radial pulses are 2+ on the right side, and 2+ on the left side.   No bilateral lower extremity edema   Pulmonary/Chest: Effort normal and breath sounds normal.   Abdominal: Soft. Normal appearance.   Neurological: He is alert and oriented to person, place, and time.   Skin: Skin is warm, dry and intact.   Psychiatric: He has a normal mood and affect.       Lab Review:       Assessment:          Diagnosis Plan   1. Coronary artery disease involving native coronary artery of native heart without angina pectoris     2. S/P drug eluting coronary stent placement     3. Essential hypertension     4. Hyperlipidemia, unspecified hyperlipidemia type     5. Fall, sequela  Holter Monitor - 72 Hour Up To 21 Days   6. Lightheadedness  Holter Monitor - 72 Hour Up To 21 Days   7. Tobacco use     8. Bradycardia, sinus     9. Syncope and collapse  Holter Monitor - 72 Hour Up To 21 Days          Plan:       1.  Possible syncope.  Not sure that the bradycardia was responsible for his symptoms.  More suspicious that relatively low blood pressures could be playing a role.  I also think we need to further evaluate for underlying conduction disease as a cause.  I have asked the patient to go ahead and discontinue the metoprolol.  Went ahead and got him set up with a ZIO monitor.   2.  Bradycardia.  This is resolved on the lower doses of metoprolol tartrate.  This should improve further with discontinuation of the medication.  3.  Hypertension.  Blood pressures remain on the low end of normal on a low-dose of metoprolol tartrate.  I do not think that he really needs antihypertensive medications.  We will see how his blood pressures do following the discontinuation of the metoprolol.  4.  Coronary artery disease.  History of a prior stent to his LAD.  Appears to be doing well from the standpoint.  5.  Premature atrial contractions.  There is premature atrial contractions today appear to have an ectopic focus.   Otherwise this is a chronic issue and he remains asymptomatic.  We will see how he does off of the beta-blockers.  6.  Hyperlipidemia.  On atorvastatin.  7.  Tobacco use.  The patient has expressed no interest in quitting.    We will call and discuss results of the ZIO monitor and determine further recommendations based on those results.    Coronary Artery Disease  Assessment  • The patient has no angina    Plan  • Lifestyle modifications discussed include adhering to a heart healthy diet, avoidance of tobacco products, maintenance of a healthy weight, medication compliance, regular exercise and regular monitoring of cholesterol and blood pressure    Subjective - Objective  • There is a history of past MI 12/2013  • There has been a previous stent procedure using DAYSI 12/2013  • Current antiplatelet therapy includes aspirin 81 mg

## 2019-04-23 NOTE — PROGRESS NOTES
Called and discussed results of monitor which did not show any cause of his falls or syncope or significant bradyarrhythmias.  No further work up at this time.  Will follow up in 6 months.

## 2020-01-24 ENCOUNTER — OFFICE VISIT (OUTPATIENT)
Dept: CARDIOLOGY | Facility: CLINIC | Age: 85
End: 2020-01-24

## 2020-01-24 VITALS
OXYGEN SATURATION: 97 % | DIASTOLIC BLOOD PRESSURE: 70 MMHG | HEIGHT: 71 IN | SYSTOLIC BLOOD PRESSURE: 102 MMHG | HEART RATE: 55 BPM | BODY MASS INDEX: 26.6 KG/M2 | WEIGHT: 190 LBS

## 2020-01-24 DIAGNOSIS — Z72.0 TOBACCO USE: ICD-10-CM

## 2020-01-24 DIAGNOSIS — I10 ESSENTIAL HYPERTENSION: ICD-10-CM

## 2020-01-24 DIAGNOSIS — Z95.5 S/P DRUG ELUTING CORONARY STENT PLACEMENT: ICD-10-CM

## 2020-01-24 DIAGNOSIS — I25.10 CAD IN NATIVE ARTERY: Primary | ICD-10-CM

## 2020-01-24 DIAGNOSIS — E78.5 HYPERLIPIDEMIA, UNSPECIFIED HYPERLIPIDEMIA TYPE: ICD-10-CM

## 2020-01-24 PROCEDURE — 93000 ELECTROCARDIOGRAM COMPLETE: CPT | Performed by: INTERNAL MEDICINE

## 2020-01-24 PROCEDURE — 99214 OFFICE O/P EST MOD 30 MIN: CPT | Performed by: INTERNAL MEDICINE

## 2020-01-24 NOTE — PROGRESS NOTES
Subjective:     Encounter Date:01/24/2020      Patient ID: Nickolas Drake is a 85 y.o. male.    Chief Complaint:  History of Present Illness    This is an 85-year-old with a history of hypertension, hyperlipidemia, tobacco use, coronary artery disease status post prior anterior lateral ST elevation MI and drug eluting stent placement of his mid left anterior descending artery, who presents for follow-up.     I began following the patient when he presented in 12/2013 with an anterolateral ST elevation MI.  His cardiac catheterizations time showed a significant mid LAD thrombotic stenosis of 95% for which he underwent drug-eluting stent placement.  Initially his echocardiogram showed septal wall hypokinesis but preserved left ventricular systolic function.  A repeat echocardiogram and follow-up showed resolution of his wall motion abnormalities and continued normal left ventricular systolic function.  He remained on Ticagrelor approximately one year following his stent placement.  He has continued to smoke 4-5 cigarettes since then and has not expressed any interest in quitting.      In 3/2019 he presented for ER follow up.  Prior to that office visit he reported a total of 3 falls the first associated with possible syncope.  The patient reported that the first episode he fell to the floor and had no preceding symptoms.  He reports that it happened so quickly he was unable to stop it.  Not clear if he lost consciousness.  The other 2 episodes resulted in falling but no loss of consciousness.  He reports that these episodes were preceded by lightheadedness.  In the emergency room his workup was unremarkable including imaging of his head.  Apparently he was noted to be bradycardic with heart rates in the 40s-50s for periods of time.  For this reason they recommended that he decrease his metoprolol tartrate to 12.5 mg twice a day.  He appeared to be tolerating the lower dose of the metoprolol.  His heart rates  were in the 60s-70s and his systolic blood pressures were in the 100s-120s.    I recommended proceeding with a ZIO monitor at that time.  This showed no evidence of any arrhythmias that would explain his syncopal episodes.  It also showed an average heart rate of 66 bpm, minimum heart rate of 44 bpm, and a maximum heart rate 184 bpm, and no evidence of sinoatrial or AV nohemi  disease.    Today he presents for routine follow-up.  He reports he has been feeling well.  Denies any chest pain, shortness of breath, near-syncope or syncope, lower extremity edema, palpitations.  He has had recent falls but denies any syncope with this.  He reports that he was off of the metoprolol for period of time but this was just resumed by Dr. Leija recently but he is not sure why.  His blood pressures are running relatively low today but he denies any lightheadedness.      Review of Systems   Constitution: Negative for malaise/fatigue.   HENT: Negative for hearing loss, hoarse voice, nosebleeds and sore throat.    Eyes: Negative for pain.   Cardiovascular: Negative for chest pain, claudication, cyanosis, dyspnea on exertion, irregular heartbeat, leg swelling, near-syncope, orthopnea, palpitations, paroxysmal nocturnal dyspnea and syncope.   Respiratory: Negative for shortness of breath and snoring.    Endocrine: Negative for cold intolerance, heat intolerance, polydipsia, polyphagia and polyuria.   Skin: Negative for itching and rash.   Musculoskeletal: Negative for arthritis, falls, joint pain, joint swelling, muscle cramps, muscle weakness and myalgias.   Gastrointestinal: Negative for constipation, diarrhea, dysphagia, heartburn, hematemesis, hematochezia, melena, nausea and vomiting.   Genitourinary: Negative for frequency, hematuria and hesitancy.   Neurological: Negative for excessive daytime sleepiness, dizziness, headaches, light-headedness, numbness and weakness.   Psychiatric/Behavioral: Negative for depression. The  patient is not nervous/anxious.          Current Outpatient Medications:   •  aspirin 81 MG tablet, Take 1 tablet by mouth daily., Disp: , Rfl:   •  atorvastatin (LIPITOR) 20 MG tablet, Take 1 tablet by mouth daily., Disp: , Rfl:   •  metoprolol tartrate (LOPRESSOR) 25 MG tablet, Take 25 mg by mouth 2 (Two) Times a Day., Disp: , Rfl:   •  Omega-3 Fatty Acids (FISH OIL) 1000 MG capsule capsule, Take 1 capsule by mouth daily., Disp: , Rfl:     Past Medical History:   Diagnosis Date   • Atherosclerotic heart disease     s/p anterior ST elevation MI 12/12/2013: mid LAD Xience xpedition 2.5 x 23, 2.5 x 12 mm stent. Proximal LAD 20% stenosis. Mid LCx 20-30% stenosis. Mid RCA 20% stenosis.   • CAD (coronary artery disease)    • Dyslipidemia    • Health care maintenance    • Hyperlipidemia    • Hypertension    • Ischemic cardiomyopathy    • Near syncope    • ST elevation (STEMI) myocardial infarction (CMS/HCC)        Past Surgical History:   Procedure Laterality Date   • CARDIAC CATHETERIZATION Left 12/12/2013    [facility], selective coronary angiogram, PCI stent of mid LAD, left ventricular angiogram, Dr. Janet Snowden       Family History   Problem Relation Age of Onset   • No Known Problems Mother    • No Known Problems Father        Social History     Tobacco Use   • Smoking status: Current Every Day Smoker   • Smokeless tobacco: Never Used   • Tobacco comment: 4 or less cig/day   Substance Use Topics   • Alcohol use: No     Frequency: Never     Comment: rare  / caffeine use   • Drug use: No         ECG 12 Lead  Date/Time: 1/24/2020 4:01 PM  Performed by: Janet Snowden MD  Authorized by: Janet Snowden MD   Comparison: compared with previous ECG   Comparison to previous ECG: PVCs are new  Rhythm: sinus rhythm  Ectopy: unifocal PVCs and atrial premature contractions               Objective:     Visit Vitals  /70 (BP Location: Right arm, Patient Position: Sitting, Cuff Size: Adult)   Pulse 55  "  Ht 180.3 cm (71\")   Wt 86.2 kg (190 lb)   SpO2 97%   BMI 26.50 kg/m²         Physical Exam   Constitutional: He is oriented to person, place, and time. He appears well-developed and well-nourished.   HENT:   Head: Normocephalic and atraumatic.   Neck: No JVD present. Carotid bruit is not present.   Cardiovascular: Normal rate, regular rhythm, S1 normal and S2 normal. Exam reveals no gallop.   No murmur heard.  Pulses:       Radial pulses are 2+ on the right side, and 2+ on the left side.   No bilateral lower extremity edema   Pulmonary/Chest: Effort normal and breath sounds normal.   Abdominal: Soft. Normal appearance.   Neurological: He is alert and oriented to person, place, and time.   Skin: Skin is warm, dry and intact.   Psychiatric: He has a normal mood and affect.       Lab Review:       Assessment:          Diagnosis Plan   1. CAD in native artery     2. S/P drug eluting coronary stent placement     3. Essential hypertension     4. Hyperlipidemia, unspecified hyperlipidemia type     5. Tobacco use            Plan:       1.  History of hypertension.  His blood pressures are on the low end of normal today on a low-dose of metoprolol tartrate.  Will try to obtain Dr. Leija's most recent office note to see if there was a reason why this medication was resumed at the higher dose.  Based on that review we will determine if we can have the patient discontinue the metoprolol.  2.  Coronary artery disease.  History of prior LAD stent.  No recurrent anginal symptoms.  3.  PACs/PVCs.  Appears asymptomatic.  4.  Hyperlipidemia.  On atorvastatin which is managed by Dr. Leija.  5.  Tobacco use.  The patient expresses no interest in quitting.    We will plan on seeing the patient back again in 6 months.         "

## 2020-01-28 ENCOUNTER — TELEPHONE (OUTPATIENT)
Dept: CARDIOLOGY | Facility: CLINIC | Age: 85
End: 2020-01-28

## 2020-01-28 NOTE — TELEPHONE ENCOUNTER
Reviewed office note from Dr. Leija's office.  His BP was 109/70 off of metoprolol and she indicated that he was doing fine off of medication.  It's unclear why metoprolol was renewed (?mistake).  I called the patient and asked him to discontinue the metoprolol since his blood pressures were running low on it.  He verbalized understanding.

## 2020-07-24 ENCOUNTER — OFFICE VISIT (OUTPATIENT)
Dept: CARDIOLOGY | Facility: CLINIC | Age: 85
End: 2020-07-24

## 2020-07-24 VITALS
SYSTOLIC BLOOD PRESSURE: 116 MMHG | BODY MASS INDEX: 24.75 KG/M2 | HEIGHT: 71 IN | DIASTOLIC BLOOD PRESSURE: 78 MMHG | HEART RATE: 69 BPM | WEIGHT: 176.8 LBS

## 2020-07-24 DIAGNOSIS — Z95.5 S/P DRUG ELUTING CORONARY STENT PLACEMENT: ICD-10-CM

## 2020-07-24 DIAGNOSIS — I25.10 CORONARY ARTERY DISEASE INVOLVING NATIVE CORONARY ARTERY OF NATIVE HEART WITHOUT ANGINA PECTORIS: Primary | ICD-10-CM

## 2020-07-24 DIAGNOSIS — E78.5 HYPERLIPIDEMIA, UNSPECIFIED HYPERLIPIDEMIA TYPE: ICD-10-CM

## 2020-07-24 DIAGNOSIS — Z72.0 TOBACCO USE: ICD-10-CM

## 2020-07-24 DIAGNOSIS — I10 ESSENTIAL HYPERTENSION: ICD-10-CM

## 2020-07-24 PROCEDURE — 99214 OFFICE O/P EST MOD 30 MIN: CPT | Performed by: INTERNAL MEDICINE

## 2020-07-24 PROCEDURE — 93000 ELECTROCARDIOGRAM COMPLETE: CPT | Performed by: INTERNAL MEDICINE

## 2020-07-24 NOTE — PROGRESS NOTES
Subjective:     Encounter Date:07/24/2020      Patient ID: Nickolas Drake is a 86 y.o. male.    Chief Complaint:  History of Present Illness    This is an 86-year-old with a history of hypertension, hyperlipidemia, tobacco use, coronary artery disease status post prior anterior lateral ST elevation MI and drug eluting stent placement of his mid left anterior descending artery, who presents for follow-up.     He presents for routine follow-up.  He denies any chest pain, shortness of breath, palpitations, orthopnea, near-syncope or syncope, or lower extremity edema.  He remains off the metoprolol tartrate.    Prior History:  I began following the patient when he presented in 12/2013 with an anterolateral ST elevation MI.  His cardiac catheterizations time showed a significant mid LAD thrombotic stenosis of 95% for which he underwent drug-eluting stent placement.  Initially his echocardiogram showed septal wall hypokinesis but preserved left ventricular systolic function.  A repeat echocardiogram and follow-up showed resolution of his wall motion abnormalities and continued normal left ventricular systolic function.  He remained on Ticagrelor approximately one year following his stent placement.  He has continued to smoke 4-5 cigarettes since then and has not expressed any interest in quitting.      In 3/2019 he presented for ER follow up.  Prior to that office visit he reported a total of 3 falls the first associated with possible syncope.  The patient reported that the first episode he fell to the floor and had no preceding symptoms.  He reports that it happened so quickly he was unable to stop it.  Not clear if he lost consciousness.  The other 2 episodes resulted in falling but no loss of consciousness.  He reports that these episodes were preceded by lightheadedness.  In the emergency room his workup was unremarkable including imaging of his head.  Apparently he was noted to be bradycardic with heart rates  in the 40s-50s for periods of time.  For this reason they recommended that he decrease his metoprolol tartrate to 12.5 mg twice a day.  He appeared to be tolerating the lower dose of the metoprolol.  His heart rates were in the 60s-70s and his systolic blood pressures were in the 100s-120s.    I recommended proceeding with a ZIO monitor at that time.  This showed no evidence of any arrhythmias that would explain his syncopal episodes.  It also showed an average heart rate of 66 bpm, minimum heart rate of 44 bpm, and a maximum heart rate 184 bpm, and no evidence of sinoatrial or AV nohemi  disease.     In 1/2020 he was noted to have some relatively low blood pressures although his heart rates had improved.  It appeared that he may have been off of the metoprolol tartrate and I recommended that he remain off of it at that time.    Review of Systems   Constitution: Negative for malaise/fatigue.   HENT: Negative for hearing loss, hoarse voice, nosebleeds and sore throat.    Eyes: Negative for pain.   Cardiovascular: Negative for chest pain, claudication, cyanosis, dyspnea on exertion, irregular heartbeat, leg swelling, near-syncope, orthopnea, palpitations, paroxysmal nocturnal dyspnea and syncope.   Respiratory: Negative for shortness of breath and snoring.    Endocrine: Negative for cold intolerance, heat intolerance, polydipsia, polyphagia and polyuria.   Skin: Negative for itching and rash.   Musculoskeletal: Negative for arthritis, falls, joint pain, joint swelling, muscle cramps, muscle weakness and myalgias.   Gastrointestinal: Negative for constipation, diarrhea, dysphagia, heartburn, hematemesis, hematochezia, melena, nausea and vomiting.   Genitourinary: Negative for frequency, hematuria and hesitancy.   Neurological: Negative for excessive daytime sleepiness, dizziness, headaches, light-headedness, numbness and weakness.   Psychiatric/Behavioral: Negative for depression. The patient is not nervous/anxious.   "        Current Outpatient Medications:   •  aspirin 81 MG tablet, Take 1 tablet by mouth daily., Disp: , Rfl:   •  atorvastatin (LIPITOR) 20 MG tablet, Take 1 tablet by mouth daily., Disp: , Rfl:   •  Omega-3 Fatty Acids (FISH OIL) 1000 MG capsule capsule, Take 1 capsule by mouth daily., Disp: , Rfl:     Past Medical History:   Diagnosis Date   • Atherosclerotic heart disease     s/p anterior ST elevation MI 12/12/2013: mid LAD Xience xpedition 2.5 x 23, 2.5 x 12 mm stent. Proximal LAD 20% stenosis. Mid LCx 20-30% stenosis. Mid RCA 20% stenosis.   • CAD (coronary artery disease)    • Dyslipidemia    • Health care maintenance    • Hyperlipidemia    • Hypertension    • Ischemic cardiomyopathy    • Near syncope    • ST elevation (STEMI) myocardial infarction (CMS/HCC)        Past Surgical History:   Procedure Laterality Date   • CARDIAC CATHETERIZATION Left 12/12/2013    [facility], selective coronary angiogram, PCI stent of mid LAD, left ventricular angiogram, Dr. Janet Snowden       Family History   Problem Relation Age of Onset   • No Known Problems Mother    • No Known Problems Father        Social History     Tobacco Use   • Smoking status: Current Every Day Smoker   • Smokeless tobacco: Never Used   • Tobacco comment: 4 or less cig/day   Substance Use Topics   • Alcohol use: No     Frequency: Never     Comment: rare  / caffeine use   • Drug use: No         ECG 12 Lead  Date/Time: 7/24/2020 3:18 PM  Performed by: Janet Snowden MD  Authorized by: Janet Snowden MD   Comparison: compared with previous ECG   Similar to previous ECG  Rhythm: sinus rhythm  Ectopy: atrial premature contractions               Objective:     Visit Vitals  /78 (BP Location: Right arm, Patient Position: Sitting)   Pulse 69   Ht 180.3 cm (71\")   Wt 80.2 kg (176 lb 12.8 oz)   BMI 24.66 kg/m²         Physical Exam   Constitutional: He is oriented to person, place, and time. He appears well-developed and " well-nourished.   HENT:   Head: Normocephalic and atraumatic.   Neck: No JVD present. Carotid bruit is not present.   Cardiovascular: Normal rate, regular rhythm, S1 normal and S2 normal. Exam reveals no gallop.   No murmur heard.  Pulses:       Radial pulses are 2+ on the right side, and 2+ on the left side.   Pulmonary/Chest: Effort normal and breath sounds normal.   Abdominal: Soft. Normal appearance.   Musculoskeletal: He exhibits no edema.   Neurological: He is alert and oriented to person, place, and time.   Skin: Skin is warm, dry and intact.   Psychiatric: He has a normal mood and affect.         Assessment:          Diagnosis Plan   1. Coronary artery disease involving native coronary artery of native heart without angina pectoris     2. Essential hypertension     3. Hyperlipidemia, unspecified hyperlipidemia type     4. S/P drug eluting coronary stent placement     5. Tobacco use            Plan:       1.  Coronary artery disease.  History of prior LAD stent.  No recent anginal symptoms.  2.  PACs/PVCs.  No PVCs today.  Has asymptomatic PACs.  3.  Hyperlipidemia.  On atorvastatin for goal LDL of around 70 or below.  This is managed by Dr. Leija.  4.  Tobacco use.  Patient has no interested in quitting.    We will plan on seeing the patient back again in 1 year or sooner if further issues arise.

## 2021-07-28 ENCOUNTER — OFFICE VISIT (OUTPATIENT)
Dept: CARDIOLOGY | Facility: CLINIC | Age: 86
End: 2021-07-28

## 2021-07-28 VITALS
OXYGEN SATURATION: 99 % | SYSTOLIC BLOOD PRESSURE: 112 MMHG | WEIGHT: 173 LBS | HEART RATE: 68 BPM | BODY MASS INDEX: 24.22 KG/M2 | HEIGHT: 71 IN | DIASTOLIC BLOOD PRESSURE: 68 MMHG

## 2021-07-28 DIAGNOSIS — I25.10 CORONARY ARTERY DISEASE INVOLVING NATIVE CORONARY ARTERY OF NATIVE HEART WITHOUT ANGINA PECTORIS: Primary | ICD-10-CM

## 2021-07-28 DIAGNOSIS — Z95.5 S/P DRUG ELUTING CORONARY STENT PLACEMENT: ICD-10-CM

## 2021-07-28 DIAGNOSIS — I10 ESSENTIAL HYPERTENSION: ICD-10-CM

## 2021-07-28 DIAGNOSIS — Z72.0 TOBACCO USE: ICD-10-CM

## 2021-07-28 DIAGNOSIS — E78.5 HYPERLIPIDEMIA, UNSPECIFIED HYPERLIPIDEMIA TYPE: ICD-10-CM

## 2021-07-28 PROCEDURE — 93000 ELECTROCARDIOGRAM COMPLETE: CPT | Performed by: NURSE PRACTITIONER

## 2021-07-28 PROCEDURE — 99214 OFFICE O/P EST MOD 30 MIN: CPT | Performed by: NURSE PRACTITIONER

## 2021-07-28 NOTE — PROGRESS NOTES
Date of Office Visit: 21  Encounter Provider: LAVINIA Segal  Primary Cardiologist: Dr. Snowden  Place of Service: [facility] CARDIOLOGY  Patient Name: Nickolas Drake  :1934      Subjective:     Chief Complaint:  Yearly CAD follow up    History of Present Illness:  Nickolas Drake is a pleasant 87 y.o. male who is new to me .  Outside records have been requested and reviewed by me if available.     This is a patient of Dr. Snowden with a history of anterolateral STEMI in 2013 status post drug-eluting stent placement to mid LAD, tobacco use, hypertension, hyperlipidemia.    2013 Dr. Snowden began following the patient when he presented with anterolateral ST elevation MI.  Cath at the time showed significant mid LAD thrombotic stenosis of 95% for which she underwent drug-eluting stent placement.  Initially his echocardiogram showed septal wall hypokinesis but preserved LV systolic function.  Repeat echo showed resolution of his wall motion abnormalities and continued normal LV systolic function.  He remained on ticagrelor approximately 1 year following his stent placement.    3/2019 patient presented for ER follow-up.  Prior to that office visit he had a total of 3 falls the first associated with possible syncope.  Apparently fell to the floor with no preceding symptoms.  Reportedly it happened so quickly was unable to stop it but was unclear if he lost consciousness.  2 other falls occurred but no loss of consciousness but were preceded by lightheadedness.  His work-up was unremarkable including imaging of his head but he was noted to be bradycardic with heart rates in the 40s and 50s.  He was recommended to decrease his metoprolol tartrate to 12.5 mg twice daily.  Zio patch was ordered at the time for work-up that showed no evidence of arrhythmias that would explain his episodes average heart rate 66 bpm minimum heart rate 44 bpm maxillary 184 with no evidence of  sinoatrial AV nohemi disease.    1/2020 patient was noted to have some relatively low blood pressures although his heart rates have improved.  Apparently he may have been off the metoprolol tartrate so was recommended he could remain off at that time.    7/24/2020 patient was last in the office to see Dr. Snowden.  He was doing well without chest pain, shortness breath, palpitations, syncope, near-syncope, edema, orthopnea and remained off the metoprolol tartrate.  1 year follow-up was recommended.    He is presenting today for yearly follow-up.  He is accompanied today by his stepson.  He is feeling well.  He stays active around his house but does not go outside for much activity.  He is having no chest pain, shortness of breath, syncope, near syncope, dizziness, lightheadedness, edema or significant fatigue.  He has lost 10 to 12 pounds in the last year but he has been trying.  He does have some easy bruising but no other bleeding issues.  He states that he has not had lab work within PCP in a while and his last appointment was a virtual visit.      Past Medical History:   Diagnosis Date   • Atherosclerotic heart disease     s/p anterior ST elevation MI 12/12/2013: mid LAD Xience xpedition 2.5 x 23, 2.5 x 12 mm stent. Proximal LAD 20% stenosis. Mid LCx 20-30% stenosis. Mid RCA 20% stenosis.   • CAD (coronary artery disease)    • Dyslipidemia    • Health care maintenance    • Hyperlipidemia    • Hypertension    • Ischemic cardiomyopathy    • Near syncope    • ST elevation (STEMI) myocardial infarction (CMS/[facility]ly [facility])      Past Surgical History:   Procedure Laterality Date   • CARDIAC CATHETERIZATION Left 12/12/2013    [facility], selective coronary angiogram, PCI stent of mid LAD, left ventricular angiogram, Dr. Janet Snowden     Outpatient Medications Prior to Visit   Medication Sig Dispense Refill   • aspirin 81 MG tablet Take 1 tablet by mouth daily.     • atorvastatin (LIPITOR) 20 MG tablet Take 1 tablet  by mouth daily.     • Omega-3 Fatty Acids (FISH OIL) 1000 MG capsule capsule Take 1 capsule by mouth daily.       No facility-administered medications prior to visit.       Allergies as of 07/28/2021 - Reviewed 07/24/2020   Allergen Reaction Noted   • Other  01/27/2014     Social History     Socioeconomic History   • Marital status:      Spouse name: Not on file   • Number of children: Not on file   • Years of education: Not on file   • Highest education level: Not on file   Tobacco Use   • Smoking status: Current Every Day Smoker   • Smokeless tobacco: Never Used   • Tobacco comment: 4 or less cig/day   Substance and Sexual Activity   • Alcohol use: No     Comment: rare  / caffeine use   • Drug use: No   • Sexual activity: Defer     Family History   Problem Relation Age of Onset   • No Known Problems Mother    • No Known Problems Father      Review of Systems   Constitutional: Negative for chills, fever, malaise/fatigue, weight gain and weight loss.   Cardiovascular: Negative for chest pain, dyspnea on exertion, irregular heartbeat, leg swelling, near-syncope, orthopnea, palpitations, paroxysmal nocturnal dyspnea and syncope.   Respiratory: Negative for cough, snoring and wheezing.    Hematologic/Lymphatic: Negative for bleeding problem. Does not bruise/bleed easily.   Skin: Negative for color change.   Musculoskeletal: Negative for falls, joint pain and myalgias.   Gastrointestinal: Negative for diarrhea, melena, nausea and vomiting.   Genitourinary: Negative for hematuria.   Neurological: Negative for excessive daytime sleepiness, dizziness and light-headedness.   Psychiatric/Behavioral: Negative for depression. The patient is not nervous/anxious.           Objective:     There were no vitals filed for this visit.  There is no height or weight on file to calculate BMI.    PHYSICAL EXAM:  Vitals and nursing note reviewed.   Constitutional:       General: Not in acute distress.     Appearance: Well-developed  and not in distress. Not diaphoretic.   HENT:      Head: Normocephalic and atraumatic.   Neck:      Vascular: No carotid bruit or JVD.   Pulmonary:      Effort: Pulmonary effort is normal. No respiratory distress.      Breath sounds: Normal breath sounds. No decreased breath sounds. No wheezing. No rhonchi. No rales.   Cardiovascular:      Normal rate. Regular rhythm.      Murmurs: There is no murmur.      Comments: No pitting lower extremity edema  Pulses:     Intact distal pulses.   Abdominal:      General: Bowel sounds are normal. There is no distension.      Palpations: Abdomen is soft.      Tenderness: There is no abdominal tenderness.   Skin:     General: Skin is warm and dry.      Findings: No erythema.   Neurological:      Mental Status: Alert and oriented to person, place, and time.   Psychiatric:         Attention and Perception: Attention normal.         Mood and Affect: Mood normal.         Speech: Speech normal.         Behavior: Behavior normal.         Thought Content: Thought content normal.         Judgment: Judgment normal.           ECG 12 Lead    Date/Time: 7/28/2021 2:16 PM  Performed by: Makenzie Kaiser APRN  Authorized by: Makenzie Kaiser APRN   Comparison: compared with previous ECG from 7/24/2020  Similar to previous ECG  Comparison to previous ECG: Sinus versus ectopic atrial rhythm  Rhythm: sinus rhythm  Ectopy: atrial premature contractions  Rate: bradycardic  BPM: 57  Other findings: low voltage    Clinical impression: non-specific ECG  Comments: No significant ECG changes  Indication: CAD with prior MI and stenting            Most recent lab review:  None. He appears overdue.    Assessment:       Diagnosis Plan   1. Coronary artery disease involving native coronary artery of native heart without angina pectoris     2. S/P drug eluting coronary stent placement     3. Essential hypertension     4. Hyperlipidemia, unspecified hyperlipidemia type     5. Tobacco use         Plan:     1.   Coronary artery disease.  History of prior LAD stent with anterolateral MI in 2013.  No recent anginal symptoms.  He is on aspirin and statin.  2.    History of PACs/PVCs.  No PVCs today.  Has asymptomatic PACs.  Continue to monitor.  Denies palpitations.  3.  Hyperlipidemia:  On atorvastatin for goal LDL of around 70 or below.  This is managed by PCP Dr. Leija.  4.  Tobacco use: Patient was counseled and is aware of the risk of continued smoking but is not interested in quitting.   He smokes about 5 cigarettes a day.  5.  History of lightheadedness/bradycardia possible syncope and falls: Resolved after metoprolol tartrate was stopped.  Heart rate stable upper 50s to 60s today.  Blood pressure stable today.    He is overall doing well and I will not make any changes today.  He denied the need for medication refills.  I advised on the importance of medication compliance, good blood pressure, blood sugar and cholesterol control, as well as heart healthy diet, regular exercise and avoidance of tobacco for cardiovascular disease risk factor modification.     I have requested most recent PCP labs but I believe he is overdue.  Recommended that he make sure he has a fasting lipid panel and CMP with his PCP.  He verbalized understanding.    Follow up with Dr. Snowden in 1 year, unless otherwise needed sooner.  I advised the patient to contact our office with any questions or concerns.       It has been a pleasure to participate in this patient's care. Please feel free to contact me with any questions or concerns.     Makenzie Kaiser, LAVINIA  07/28/21             Your medication list          Accurate as of July 28, 2021 10:48 AM. If you have any questions, ask your nurse or doctor.            CONTINUE taking these medications      Instructions Last Dose Given Next Dose Due   aspirin 81 MG tablet      Take 1 tablet by mouth daily.       atorvastatin 20 MG tablet  Commonly known as: LIPITOR      Take 1 tablet by mouth daily.        fish oil 1000 MG capsule capsule      Take 1 capsule by mouth daily.              The above medication changes may not have been made by this provider.  Medication list was updated to reflect medications patient is currently taking including medication changes and discontinuations made by other healthcare providers or the patients themselves.     Dictated utilizing Dragon Dictation System.

## 2024-05-21 ENCOUNTER — APPOINTMENT (OUTPATIENT)
Dept: GENERAL RADIOLOGY | Facility: HOSPITAL | Age: 89
DRG: 522 | End: 2024-05-21
Payer: MEDICARE

## 2024-05-21 ENCOUNTER — HOSPITAL ENCOUNTER (INPATIENT)
Facility: HOSPITAL | Age: 89
LOS: 4 days | Discharge: NURSING, REHAB OR LONG-TERM CARE | DRG: 522 | End: 2024-05-25
Attending: EMERGENCY MEDICINE | Admitting: INTERNAL MEDICINE
Payer: MEDICARE

## 2024-05-21 DIAGNOSIS — G89.18 ACUTE POST-OPERATIVE PAIN: ICD-10-CM

## 2024-05-21 DIAGNOSIS — S72.002A CLOSED FRACTURE OF NECK OF LEFT FEMUR, INITIAL ENCOUNTER: Primary | ICD-10-CM

## 2024-05-21 DIAGNOSIS — S51.012A SKIN TEAR OF LEFT ELBOW WITHOUT COMPLICATION, INITIAL ENCOUNTER: ICD-10-CM

## 2024-05-21 PROBLEM — Z66 DNR (DO NOT RESUSCITATE): Status: ACTIVE | Noted: 2024-05-21

## 2024-05-21 LAB
ALBUMIN SERPL-MCNC: 3.4 G/DL (ref 3.5–5.2)
ALBUMIN/GLOB SERPL: 1.2 G/DL
ALP SERPL-CCNC: 110 U/L (ref 39–117)
ALT SERPL W P-5'-P-CCNC: 12 U/L (ref 1–41)
ANION GAP SERPL CALCULATED.3IONS-SCNC: 10.2 MMOL/L (ref 5–15)
AST SERPL-CCNC: 26 U/L (ref 1–40)
BASOPHILS # BLD AUTO: 0.14 10*3/MM3 (ref 0–0.2)
BASOPHILS NFR BLD AUTO: 0.9 % (ref 0–1.5)
BILIRUB SERPL-MCNC: 0.2 MG/DL (ref 0–1.2)
BUN SERPL-MCNC: 19 MG/DL (ref 8–23)
BUN/CREAT SERPL: 16.5 (ref 7–25)
CALCIUM SPEC-SCNC: 8.6 MG/DL (ref 8.6–10.5)
CHLORIDE SERPL-SCNC: 100 MMOL/L (ref 98–107)
CO2 SERPL-SCNC: 26.8 MMOL/L (ref 22–29)
CREAT SERPL-MCNC: 1.15 MG/DL (ref 0.76–1.27)
DEPRECATED RDW RBC AUTO: 41.9 FL (ref 37–54)
EGFRCR SERPLBLD CKD-EPI 2021: 60.8 ML/MIN/1.73
EOSINOPHIL # BLD AUTO: 0.53 10*3/MM3 (ref 0–0.4)
EOSINOPHIL NFR BLD AUTO: 3.5 % (ref 0.3–6.2)
ERYTHROCYTE [DISTWIDTH] IN BLOOD BY AUTOMATED COUNT: 12.2 % (ref 12.3–15.4)
GLOBULIN UR ELPH-MCNC: 2.9 GM/DL
GLUCOSE SERPL-MCNC: 108 MG/DL (ref 65–99)
HCT VFR BLD AUTO: 38.8 % (ref 37.5–51)
HGB BLD-MCNC: 13.5 G/DL (ref 13–17.7)
IMM GRANULOCYTES # BLD AUTO: 0.1 10*3/MM3 (ref 0–0.05)
IMM GRANULOCYTES NFR BLD AUTO: 0.7 % (ref 0–0.5)
LYMPHOCYTES # BLD AUTO: 1.97 10*3/MM3 (ref 0.7–3.1)
LYMPHOCYTES NFR BLD AUTO: 13 % (ref 19.6–45.3)
MCH RBC QN AUTO: 33.4 PG (ref 26.6–33)
MCHC RBC AUTO-ENTMCNC: 34.8 G/DL (ref 31.5–35.7)
MCV RBC AUTO: 96 FL (ref 79–97)
MONOCYTES # BLD AUTO: 1.38 10*3/MM3 (ref 0.1–0.9)
MONOCYTES NFR BLD AUTO: 9.1 % (ref 5–12)
NEUTROPHILS NFR BLD AUTO: 11.03 10*3/MM3 (ref 1.7–7)
NEUTROPHILS NFR BLD AUTO: 72.8 % (ref 42.7–76)
NRBC BLD AUTO-RTO: 0 /100 WBC (ref 0–0.2)
PLATELET # BLD AUTO: 277 10*3/MM3 (ref 140–450)
PMV BLD AUTO: 9.3 FL (ref 6–12)
POTASSIUM SERPL-SCNC: 4.6 MMOL/L (ref 3.5–5.2)
PROT SERPL-MCNC: 6.3 G/DL (ref 6–8.5)
RBC # BLD AUTO: 4.04 10*6/MM3 (ref 4.14–5.8)
SODIUM SERPL-SCNC: 137 MMOL/L (ref 136–145)
WBC NRBC COR # BLD AUTO: 15.15 10*3/MM3 (ref 3.4–10.8)

## 2024-05-21 PROCEDURE — 36415 COLL VENOUS BLD VENIPUNCTURE: CPT

## 2024-05-21 PROCEDURE — 94799 UNLISTED PULMONARY SVC/PX: CPT

## 2024-05-21 PROCEDURE — 71045 X-RAY EXAM CHEST 1 VIEW: CPT

## 2024-05-21 PROCEDURE — 85025 COMPLETE CBC W/AUTO DIFF WBC: CPT | Performed by: EMERGENCY MEDICINE

## 2024-05-21 PROCEDURE — 73502 X-RAY EXAM HIP UNI 2-3 VIEWS: CPT

## 2024-05-21 PROCEDURE — 93010 ELECTROCARDIOGRAM REPORT: CPT | Performed by: INTERNAL MEDICINE

## 2024-05-21 PROCEDURE — 94640 AIRWAY INHALATION TREATMENT: CPT

## 2024-05-21 PROCEDURE — 25010000002 HYDROMORPHONE PER 4 MG: Performed by: INTERNAL MEDICINE

## 2024-05-21 PROCEDURE — 99285 EMERGENCY DEPT VISIT HI MDM: CPT

## 2024-05-21 PROCEDURE — 93005 ELECTROCARDIOGRAM TRACING: CPT | Performed by: PHYSICIAN ASSISTANT

## 2024-05-21 PROCEDURE — 25810000003 SODIUM CHLORIDE 0.9 % SOLUTION: Performed by: INTERNAL MEDICINE

## 2024-05-21 PROCEDURE — 25010000002 MORPHINE PER 10 MG: Performed by: EMERGENCY MEDICINE

## 2024-05-21 PROCEDURE — 80053 COMPREHEN METABOLIC PANEL: CPT | Performed by: EMERGENCY MEDICINE

## 2024-05-21 PROCEDURE — 94761 N-INVAS EAR/PLS OXIMETRY MLT: CPT

## 2024-05-21 RX ORDER — BISACODYL 10 MG
10 SUPPOSITORY, RECTAL RECTAL DAILY PRN
Status: DISCONTINUED | OUTPATIENT
Start: 2024-05-21 | End: 2024-05-25 | Stop reason: HOSPADM

## 2024-05-21 RX ORDER — IPRATROPIUM BROMIDE AND ALBUTEROL SULFATE 2.5; .5 MG/3ML; MG/3ML
3 SOLUTION RESPIRATORY (INHALATION)
Status: DISCONTINUED | OUTPATIENT
Start: 2024-05-21 | End: 2024-05-25 | Stop reason: HOSPADM

## 2024-05-21 RX ORDER — IPRATROPIUM BROMIDE AND ALBUTEROL SULFATE 2.5; .5 MG/3ML; MG/3ML
3 SOLUTION RESPIRATORY (INHALATION) EVERY 4 HOURS PRN
Status: DISCONTINUED | OUTPATIENT
Start: 2024-05-21 | End: 2024-05-22

## 2024-05-21 RX ORDER — SODIUM CHLORIDE 0.9 % (FLUSH) 0.9 %
10 SYRINGE (ML) INJECTION AS NEEDED
Status: DISCONTINUED | OUTPATIENT
Start: 2024-05-21 | End: 2024-05-25 | Stop reason: HOSPADM

## 2024-05-21 RX ORDER — SODIUM CHLORIDE 9 MG/ML
75 INJECTION, SOLUTION INTRAVENOUS CONTINUOUS
Status: DISCONTINUED | OUTPATIENT
Start: 2024-05-21 | End: 2024-05-23

## 2024-05-21 RX ORDER — ASPIRIN 81 MG/1
81 TABLET ORAL 2 TIMES DAILY
Status: DISCONTINUED | OUTPATIENT
Start: 2024-05-23 | End: 2024-05-25 | Stop reason: HOSPADM

## 2024-05-21 RX ORDER — NICOTINE 21 MG/24HR
1 PATCH, TRANSDERMAL 24 HOURS TRANSDERMAL EVERY 24 HOURS
Status: DISCONTINUED | OUTPATIENT
Start: 2024-05-21 | End: 2024-05-25 | Stop reason: HOSPADM

## 2024-05-21 RX ORDER — SODIUM CHLORIDE 9 MG/ML
40 INJECTION, SOLUTION INTRAVENOUS AS NEEDED
Status: DISCONTINUED | OUTPATIENT
Start: 2024-05-21 | End: 2024-05-25 | Stop reason: HOSPADM

## 2024-05-21 RX ORDER — MORPHINE SULFATE 2 MG/ML
2 INJECTION, SOLUTION INTRAMUSCULAR; INTRAVENOUS ONCE
Status: COMPLETED | OUTPATIENT
Start: 2024-05-21 | End: 2024-05-21

## 2024-05-21 RX ORDER — NALOXONE HCL 0.4 MG/ML
0.4 VIAL (ML) INJECTION
Status: DISCONTINUED | OUTPATIENT
Start: 2024-05-21 | End: 2024-05-25 | Stop reason: HOSPADM

## 2024-05-21 RX ORDER — ACETAMINOPHEN 325 MG/1
650 TABLET ORAL EVERY 4 HOURS PRN
Status: DISCONTINUED | OUTPATIENT
Start: 2024-05-21 | End: 2024-05-25 | Stop reason: HOSPADM

## 2024-05-21 RX ORDER — BISACODYL 5 MG/1
5 TABLET, DELAYED RELEASE ORAL DAILY PRN
Status: DISCONTINUED | OUTPATIENT
Start: 2024-05-21 | End: 2024-05-25 | Stop reason: HOSPADM

## 2024-05-21 RX ORDER — ATORVASTATIN CALCIUM 20 MG/1
20 TABLET, FILM COATED ORAL NIGHTLY
Status: DISCONTINUED | OUTPATIENT
Start: 2024-05-21 | End: 2024-05-25 | Stop reason: HOSPADM

## 2024-05-21 RX ORDER — ACETAMINOPHEN 160 MG/5ML
650 SOLUTION ORAL EVERY 4 HOURS PRN
Status: DISCONTINUED | OUTPATIENT
Start: 2024-05-21 | End: 2024-05-25 | Stop reason: HOSPADM

## 2024-05-21 RX ORDER — OXYCODONE AND ACETAMINOPHEN 7.5; 325 MG/1; MG/1
1 TABLET ORAL EVERY 4 HOURS PRN
Status: DISCONTINUED | OUTPATIENT
Start: 2024-05-21 | End: 2024-05-25 | Stop reason: HOSPADM

## 2024-05-21 RX ORDER — ONDANSETRON 4 MG/1
4 TABLET, ORALLY DISINTEGRATING ORAL EVERY 6 HOURS PRN
Status: DISCONTINUED | OUTPATIENT
Start: 2024-05-21 | End: 2024-05-25 | Stop reason: HOSPADM

## 2024-05-21 RX ORDER — ACETAMINOPHEN 650 MG/1
650 SUPPOSITORY RECTAL EVERY 4 HOURS PRN
Status: DISCONTINUED | OUTPATIENT
Start: 2024-05-21 | End: 2024-05-25 | Stop reason: HOSPADM

## 2024-05-21 RX ORDER — AMOXICILLIN 250 MG
2 CAPSULE ORAL 2 TIMES DAILY
Status: DISCONTINUED | OUTPATIENT
Start: 2024-05-21 | End: 2024-05-25 | Stop reason: HOSPADM

## 2024-05-21 RX ORDER — SODIUM CHLORIDE 0.9 % (FLUSH) 0.9 %
10 SYRINGE (ML) INJECTION EVERY 12 HOURS SCHEDULED
Status: DISCONTINUED | OUTPATIENT
Start: 2024-05-21 | End: 2024-05-25 | Stop reason: HOSPADM

## 2024-05-21 RX ORDER — POLYETHYLENE GLYCOL 3350 17 G/17G
17 POWDER, FOR SOLUTION ORAL DAILY PRN
Status: DISCONTINUED | OUTPATIENT
Start: 2024-05-21 | End: 2024-05-25 | Stop reason: HOSPADM

## 2024-05-21 RX ORDER — HYDROMORPHONE HYDROCHLORIDE 1 MG/ML
0.5 INJECTION, SOLUTION INTRAMUSCULAR; INTRAVENOUS; SUBCUTANEOUS
Status: DISCONTINUED | OUTPATIENT
Start: 2024-05-21 | End: 2024-05-25 | Stop reason: HOSPADM

## 2024-05-21 RX ORDER — ONDANSETRON 2 MG/ML
4 INJECTION INTRAMUSCULAR; INTRAVENOUS EVERY 6 HOURS PRN
Status: DISCONTINUED | OUTPATIENT
Start: 2024-05-21 | End: 2024-05-25 | Stop reason: HOSPADM

## 2024-05-21 RX ADMIN — MORPHINE SULFATE 2 MG: 2 INJECTION, SOLUTION INTRAMUSCULAR; INTRAVENOUS at 18:52

## 2024-05-21 RX ADMIN — Medication 10 ML: at 22:05

## 2024-05-21 RX ADMIN — SODIUM CHLORIDE 75 ML/HR: 9 INJECTION, SOLUTION INTRAVENOUS at 22:05

## 2024-05-21 RX ADMIN — IPRATROPIUM BROMIDE AND ALBUTEROL SULFATE 3 ML: 2.5; .5 SOLUTION RESPIRATORY (INHALATION) at 22:35

## 2024-05-21 RX ADMIN — HYDROMORPHONE HYDROCHLORIDE 0.5 MG: 1 INJECTION, SOLUTION INTRAMUSCULAR; INTRAVENOUS; SUBCUTANEOUS at 21:23

## 2024-05-21 NOTE — ED PROVIDER NOTES
EMERGENCY DEPARTMENT MD ATTESTATION NOTE    Room Number:  P780/1  PCP: Carmela Leija MD  Independent Historians: Patient, Family, and EMS    HPI:  A complete HPI/ROS/PMH/PSH/SH/FH are unobtainable due to: None    Chronic or social conditions impacting patient care (Social Determinants of Health): None      Context: Nickolas Drake is a 89 y.o. male with a medical history of dyslipidemia, hypertension, coronary artery disease who presents to the ED c/o acute fall.  The patient had a trip and fall just prior to arrival.  He reports he landed on his left hip and left elbow.  He denies hitting his head.  He is on 81 mg aspirin.  He reports severe pain with movement of his left hip.        Review of prior external notes (non-ED) -and- Review of prior external test results outside of this encounter:  Laboratory evaluation 3/18/2019 showed normal CBC, CMP with an elevated creatinine 1.36    Prescription drug monitoring program review:           PHYSICAL EXAM    I have reviewed the triage vital signs and nursing notes.    ED Triage Vitals [05/21/24 1759]   Temp Heart Rate Resp BP SpO2   98.3 °F (36.8 °C) 82 18 117/67 97 %      Temp src Heart Rate Source Patient Position BP Location FiO2 (%)   -- -- -- -- --       Physical Exam  GENERAL: Awake, alert, no acute distress  SKIN: Warm, dry  HENT: Normocephalic, atraumatic  EYES: no scleral icterus  CV: regular rhythm, regular rate  RESPIRATORY: normal effort, lungs clear  ABDOMEN: soft, nontender, nondistended  MUSCULOSKELETAL: no deformity.  Skin tear to the left elbow.  Normal range of motion without pain.  Tenderness to the left hip with range of motion.  No deformity.  No open wounds.  NEURO: alert, moves all extremities, follows commands            MEDICATIONS GIVEN IN ER  Medications   sodium chloride 0.9 % flush 10 mL (10 mL Intravenous Given 5/21/24 5434)   sodium chloride 0.9 % flush 10 mL (has no administration in time range)   sodium chloride 0.9 %  infusion 40 mL (has no administration in time range)   ondansetron ODT (ZOFRAN-ODT) disintegrating tablet 4 mg (has no administration in time range)     Or   ondansetron (ZOFRAN) injection 4 mg (has no administration in time range)   sodium chloride 0.9 % infusion (75 mL/hr Intravenous New Bag 5/21/24 2205)   Potassium Replacement - Follow Nurse / BPA Driven Protocol (has no administration in time range)   Magnesium Standard Dose Replacement - Follow Nurse / BPA Driven Protocol (has no administration in time range)   Phosphorus Replacement - Follow Nurse / BPA Driven Protocol (has no administration in time range)   Calcium Replacement - Follow Nurse / BPA Driven Protocol (has no administration in time range)   acetaminophen (TYLENOL) tablet 650 mg (has no administration in time range)     Or   acetaminophen (TYLENOL) 160 MG/5ML oral solution 650 mg (has no administration in time range)     Or   acetaminophen (TYLENOL) suppository 650 mg (has no administration in time range)   oxyCODONE-acetaminophen (PERCOCET) 7.5-325 MG per tablet 1 tablet (has no administration in time range)   HYDROmorphone (DILAUDID) injection 0.5 mg (0.5 mg Intravenous Given 5/21/24 2123)     And   naloxone (NARCAN) injection 0.4 mg (has no administration in time range)   sennosides-docusate (PERICOLACE) 8.6-50 MG per tablet 2 tablet (2 tablets Oral Not Given 5/21/24 2155)     And   polyethylene glycol (MIRALAX) packet 17 g (has no administration in time range)     And   bisacodyl (DULCOLAX) EC tablet 5 mg (has no administration in time range)     And   bisacodyl (DULCOLAX) suppository 10 mg (has no administration in time range)   nicotine (NICODERM CQ) 21 MG/24HR patch 1 patch (has no administration in time range)   nicotine polacrilex (NICORETTE) gum 2 mg (has no administration in time range)   ipratropium-albuterol (DUO-NEB) nebulizer solution 3 mL (3 mL Nebulization Given 5/21/24 2235)   ipratropium-albuterol (DUO-NEB) nebulizer solution 3  mL (has no administration in time range)   aspirin EC tablet 81 mg (has no administration in time range)   atorvastatin (LIPITOR) tablet 20 mg (20 mg Oral Not Given 5/21/24 2154)   morphine injection 2 mg (2 mg Intravenous Given 5/21/24 1852)         ORDERS PLACED DURING THIS VISIT:  Orders Placed This Encounter   Procedures    XR Hip With or Without Pelvis 2 - 3 View Left    XR Chest 1 View    Comprehensive Metabolic Panel    CBC Auto Differential    Basic Metabolic Panel    CBC Auto Differential    Diet: Regular/House; Fluid Consistency: Thin (IDDSI 0)    NPO Diet NPO Type: Strict NPO    Vital Signs    Intake & Output    Weigh patient    Oral Care    Saline Lock & Maintain IV Access    Place Sequential Compression Device    Maintain Sequential Compression Device    Opioid Administration - Document EtCO2 and / or SpO2 With Each Set of Vitals & Any Change in Patient Status    Opioid Administration - Notify Provider Hypercapnic Monitoring    Opioid Administration - Continuous Pulse Oximetry (SpO2)    Code Status and Medical Interventions:    [facility] (on-call MD unless specified) Details    Ortho (on-call MD unless specified)    Inpatient Case Management  Consult    Incentive Spirometry    ECG 12 Lead Pre-Op / Pre-Procedure    Insert Peripheral IV    Inpatient Admission    CBC & Differential         PROCEDURES  Procedures            PROGRESS, DATA ANALYSIS, CONSULTS, AND MEDICAL DECISION MAKING  All labs have been independently interpreted by me.  All radiology studies have been reviewed by me. All EKG's have been independently viewed and interpreted by me.  Discussion below represents my analysis of pertinent findings related to patient's condition, differential diagnosis, treatment plan and final disposition.    Differential diagnosis includes but is not limited to hip fracture, hip dislocation, pelvis fracture, elbow fracture, elbow abrasion.    Clinical Scores:                   ED Course as of 05/21/24  2306   e May 21, 2024   1813 Patient presents with left hip pain after mechanical fall prior to arrival.  No head, neck, back injuries.  Plan for x-rays. [EE]   1840 XR Hip With or Without Pelvis 2 - 3 View Left  My independent interpretation of the imaging study is left femoral neck fracture [TR]   1844 Updated patient and family on plan for admission. [EE]   1912 EKG          EKG time: 1903  Rhythm/Rate: Normal sinus, rate 70  P waves and AR: Normal P, normal AR  QRS, axis: Narrow QRS, left axis  ST and T waves: No acute    Independently Interpreted by me  Not significantly changed compared to prior 3/18/2019   [TR]   1929 WBC(!): 15.15 [EE]   1929 Hemoglobin: 13.5 [EE]   2017 I discussed case with Dr. Pereira [facility].  He agrees to admit.    I discussed the case with Dr. Sherwood, orthopedics.  He agrees to consult. [EE]      ED Course User Index  [EE] Yves Fletcher, PA  [TR] Armaan Reyes MD       MDM: Plan to x-ray the patient's left hip to rule out fracture or dislocation.      COMPLEXITY OF CARE  The patient requires admission.    Please note that portions of this document were completed with a voice recognition program.    Note Disclaimer: At [facility], we believe that sharing information builds trust and better relationships. You are receiving this note because you recently visited [facility]. It is possible you will see health information before a provider has talked with you about it. This kind of information can be easy to misunderstand. To help you fully understand what it means for your health, we urge you to discuss this note with your provider.         Armaan Reyes MD  05/21/24 7860

## 2024-05-21 NOTE — ED PROVIDER NOTES
EMERGENCY DEPARTMENT ENCOUNTER    Room Number:  S03/03  Date of encounter:  5/21/2024  PCP: Carmela Leija MD  Historian: Patient, EMS  Chronic or social conditions impacting care (social determinants of health): Full code from home    HPI:  Chief Complaint: Fall  A complete HPI/ROS/PMH/PSH/SH/FH are unobtainable due to: Nothing    Context: Nickolas Drake is a 89 y.o. male with a history of hyperlipidemia, coronary artery disease.  He presents to the ED c/o acute left hip pain after mechanical fall prior to arrival.  Patient states he lost his balance falling on his left hip.  He was unable to get up and called EMS.  He denies any head, neck, trunk injuries.  He was in his normal state of health prior to this.    Review of prior external notes (non-ED):   Reviewed cardiology office visit from 7/28/2021.  Patient being followed for coronary artery disease.    Review of prior external test results outside of this encounter:  I reviewed a CMP from 3/18/2019.  Creatinine 1.36, potassium 3.5    PAST MEDICAL HISTORY  Active Ambulatory Problems     Diagnosis Date Noted    Dyslipidemia 01/19/2016    CAD in native artery 01/19/2016    Essential hypertension 01/19/2016    Coronary artery disease involving native coronary artery of native heart without angina pectoris 01/20/2016    Hyperlipemia 01/20/2016    S/P drug eluting coronary stent placement 01/23/2018    Tobacco use 01/23/2018    Falls 04/03/2019    Lightheadedness 04/03/2019    Bradycardia, sinus 04/03/2019    Syncope and collapse 04/03/2019     Resolved Ambulatory Problems     Diagnosis Date Noted    No Resolved Ambulatory Problems     Past Medical History:   Diagnosis Date    Atherosclerotic heart disease     CAD (coronary artery disease)     Health care maintenance     Hyperlipidemia     Hypertension     Ischemic cardiomyopathy     Near syncope     ST elevation (STEMI) myocardial infarction          PAST SURGICAL HISTORY  Past Surgical History:    Procedure Laterality Date    CARDIAC CATHETERIZATION Left 12/12/2013    [facility], selective coronary angiogram, PCI stent of mid LAD, left ventricular angiogram, Dr. Janet Snowden         FAMILY HISTORY  Family History   Problem Relation Age of Onset    No Known Problems Mother     No Known Problems Father          SOCIAL HISTORY  Social History     Socioeconomic History    Marital status:    Tobacco Use    Smoking status: Every Day    Smokeless tobacco: Never    Tobacco comments:     approx 5 cig/day   Substance and Sexual Activity    Alcohol use: No     Comment: rare  / caffeine use    Drug use: No    Sexual activity: Defer         ALLERGIES  Other        REVIEW OF SYSTEMS  All systems reviewed and negative except for those discussed in HPI.       PHYSICAL EXAM    I have reviewed the triage vital signs and nursing notes.    ED Triage Vitals [05/21/24 1759]   Temp Heart Rate Resp BP SpO2   98.3 °F (36.8 °C) 82 18 117/67 97 %      Temp src Heart Rate Source Patient Position BP Location FiO2 (%)   -- -- -- -- --       Physical Exam  GENERAL: Alert, oriented, not distressed  HENT: head atraumatic, no cervical tenderness  EYES: no scleral icterus, EOMI  CV: regular rhythm, regular rate, no murmur  RESPIRATORY: normal effort, CTA  ABDOMEN: soft, nontender  MUSCULOSKELETAL: Mild pain to the left lateral hip with decreased range of motion.  No obvious deformity.  Neurovascularly intact distally.  NEURO: alert, moves all extremities, follows commands  SKIN: warm, dry        LAB RESULTS  Recent Results (from the past 24 hour(s))   Comprehensive Metabolic Panel    Collection Time: 05/21/24  6:54 PM    Specimen: Blood   Result Value Ref Range    Glucose 108 (H) 65 - 99 mg/dL    BUN 19 8 - 23 mg/dL    Creatinine 1.15 0.76 - 1.27 mg/dL    Sodium 137 136 - 145 mmol/L    Potassium 4.6 3.5 - 5.2 mmol/L    Chloride 100 98 - 107 mmol/L    CO2 26.8 22.0 - 29.0 mmol/L    Calcium 8.6 8.6 - 10.5 mg/dL    Total  Protein 6.3 6.0 - 8.5 g/dL    Albumin 3.4 (L) 3.5 - 5.2 g/dL    ALT (SGPT) 12 1 - 41 U/L    AST (SGOT) 26 1 - 40 U/L    Alkaline Phosphatase 110 39 - 117 U/L    Total Bilirubin 0.2 0.0 - 1.2 mg/dL    Globulin 2.9 gm/dL    A/G Ratio 1.2 g/dL    BUN/Creatinine Ratio 16.5 7.0 - 25.0    Anion Gap 10.2 5.0 - 15.0 mmol/L    eGFR 60.8 >60.0 mL/min/1.73   CBC Auto Differential    Collection Time: 05/21/24  6:54 PM    Specimen: Blood   Result Value Ref Range    WBC 15.15 (H) 3.40 - 10.80 10*3/mm3    RBC 4.04 (L) 4.14 - 5.80 10*6/mm3    Hemoglobin 13.5 13.0 - 17.7 g/dL    Hematocrit 38.8 37.5 - 51.0 %    MCV 96.0 79.0 - 97.0 fL    MCH 33.4 (H) 26.6 - 33.0 pg    MCHC 34.8 31.5 - 35.7 g/dL    RDW 12.2 (L) 12.3 - 15.4 %    RDW-SD 41.9 37.0 - 54.0 fl    MPV 9.3 6.0 - 12.0 fL    Platelets 277 140 - 450 10*3/mm3    Neutrophil % 72.8 42.7 - 76.0 %    Lymphocyte % 13.0 (L) 19.6 - 45.3 %    Monocyte % 9.1 5.0 - 12.0 %    Eosinophil % 3.5 0.3 - 6.2 %    Basophil % 0.9 0.0 - 1.5 %    Immature Grans % 0.7 (H) 0.0 - 0.5 %    Neutrophils, Absolute 11.03 (H) 1.70 - 7.00 10*3/mm3    Lymphocytes, Absolute 1.97 0.70 - 3.10 10*3/mm3    Monocytes, Absolute 1.38 (H) 0.10 - 0.90 10*3/mm3    Eosinophils, Absolute 0.53 (H) 0.00 - 0.40 10*3/mm3    Basophils, Absolute 0.14 0.00 - 0.20 10*3/mm3    Immature Grans, Absolute 0.10 (H) 0.00 - 0.05 10*3/mm3    nRBC 0.0 0.0 - 0.2 /100 WBC   ECG 12 Lead Pre-Op / Pre-Procedure    Collection Time: 05/21/24  7:03 PM   Result Value Ref Range    QT Interval 392 ms    QTC Interval 423 ms       Ordered the above labs and independently reviewed the results.        RADIOLOGY  XR Chest 1 View    Result Date: 5/21/2024  XR CHEST 1 VW-5/21/2024  HISTORY: Preop.  Heart size is within normal limits. There is mild interstitial prominence of the lungs including somewhat confluent area of increased density in the right base which is slightly more prominent than on the 3/18/2029 study and could represent some progression of  interstitial fibrosis versus some mild atelectasis or developing pneumonia. Lungs are otherwise free of acute infiltrates.      1. Small area of increased density in the right lung base may represent chronic parenchymal change versus mild atelectasis or pneumonia. 2. No other acute infiltrates are seen.   This report was finalized on 5/21/2024 7:24 PM by Dr. Chris Swartz M.D on Workstation: [id number]      XR Hip With or Without Pelvis 2 - 3 View Left    Result Date: 5/21/2024  XR HIP W OR WO PELVIS 2-3 VIEW LEFT-5/21/2024  HISTORY: Fell, left hip pain.  There is a mildly displaced fracture of the left femoral neck. Femoral head articulates with the acetabulum. No other fractures are seen in the pelvis.      1. Left femoral neck fracture.   This report was finalized on 5/21/2024 6:55 PM by Dr. Chris Swartz M.D on Workstation: [id number]       I ordered the above noted radiological studies. Reviewed by me and discussed with radiologist.  See dictation for official radiology interpretation.      MEDICATIONS GIVEN IN ER    Medications   morphine injection 2 mg (2 mg Intravenous Given 5/21/24 1852)         ADDITIONAL ORDERS CONSIDERED BUT NOT ORDERED:  Nothing      PROGRESS, DATA ANALYSIS, CONSULTS, AND MEDICAL DECISION MAKING    All labs have been independently interpreted by myself.  All radiology studies have been independently interpreted by myself and discussed with radiologist dictating the report.   EKG's independently interpreted by myself.  Discussion below represents my analysis of pertinent findings related to patient's condition, differential diagnosis, treatment plan and final disposition.    I have discussed case with Dr. Reyes, emergency room physician.  He has performed his own bedside examination and agrees with treatment plan.    ED Course as of 05/21/24 2031   Tue May 21, 2024   1813 Patient presents with left hip pain after mechanical fall prior to arrival.  No head, neck, back injuries.   Plan for x-rays. [EE]   1840 XR Hip With or Without Pelvis 2 - 3 View Left  My independent interpretation of the imaging study is left femoral neck fracture [TR]   1844 Updated patient and family on plan for admission. [EE]   1912 EKG          EKG time: 1903  Rhythm/Rate: Normal sinus, rate 70  P waves and UT: Normal P, normal UT  QRS, axis: Narrow QRS, left axis  ST and T waves: No acute    Independently Interpreted by me  Not significantly changed compared to prior 3/18/2019   [TR]   1929 WBC(!): 15.15 [EE]   1929 Hemoglobin: 13.5 [EE]   2017 I discussed case with Dr. Pereira, [facility].  He agrees to admit.    I discussed the case with Dr. Sherwood, orthopedics.  He agrees to consult. [EE]      ED Course User Index  [EE] Yves Fletcher PA  [TR] Armaan Reyes MD       AS OF 20:31 EDT VITALS:    BP - 117/67  HR - 82  TEMP - 98.3 °F (36.8 °C)  O2 SATS - 97%        DIAGNOSIS  Final diagnoses:   Closed fracture of neck of left femur, initial encounter   Skin tear of left elbow without complication, initial encounter         DISPOSITION  Admitted      Dictated utilizing Dragon dictation     Yves Fletcher PA  05/21/24 2032

## 2024-05-21 NOTE — ED NOTES
Skin tear to left elbow cleansed with sterile normal saline, triple antibiotic ointment applied. Dressed with non-adherent pad and Kerlix.

## 2024-05-21 NOTE — ED NOTES
Pt was brought in by ems from home for fall. Pt was walking when [facility] tripped[facility] Pt c/o left upper leg pain when it is moved. No tenderness on palpation. No obvious deformity, rotation or shortening. Denies hitting head

## 2024-05-21 NOTE — ED TRIAGE NOTES
Patient to ed from home via ems with complaints of a fall. Patient turned to switch off a light and fell. No loc or lightheadness. Patient reports left hip pain.

## 2024-05-22 ENCOUNTER — APPOINTMENT (OUTPATIENT)
Dept: GENERAL RADIOLOGY | Facility: HOSPITAL | Age: 89
DRG: 522 | End: 2024-05-22
Payer: MEDICARE

## 2024-05-22 ENCOUNTER — ANESTHESIA EVENT (OUTPATIENT)
Dept: PERIOP | Facility: HOSPITAL | Age: 89
End: 2024-05-22
Payer: MEDICARE

## 2024-05-22 ENCOUNTER — ANESTHESIA (OUTPATIENT)
Dept: PERIOP | Facility: HOSPITAL | Age: 89
End: 2024-05-22
Payer: MEDICARE

## 2024-05-22 PROBLEM — S72.002A CLOSED FRACTURE OF NECK OF LEFT FEMUR: Status: RESOLVED | Noted: 2024-05-21 | Resolved: 2024-05-22

## 2024-05-22 LAB
ANION GAP SERPL CALCULATED.3IONS-SCNC: 9 MMOL/L (ref 5–15)
BASOPHILS # BLD AUTO: 0.14 10*3/MM3 (ref 0–0.2)
BASOPHILS NFR BLD AUTO: 1 % (ref 0–1.5)
BUN SERPL-MCNC: 18 MG/DL (ref 8–23)
BUN/CREAT SERPL: 17.8 (ref 7–25)
CALCIUM SPEC-SCNC: 8.2 MG/DL (ref 8.6–10.5)
CHLORIDE SERPL-SCNC: 101 MMOL/L (ref 98–107)
CO2 SERPL-SCNC: 26 MMOL/L (ref 22–29)
CREAT SERPL-MCNC: 1.01 MG/DL (ref 0.76–1.27)
DEPRECATED RDW RBC AUTO: 42.9 FL (ref 37–54)
EGFRCR SERPLBLD CKD-EPI 2021: 71.1 ML/MIN/1.73
EOSINOPHIL # BLD AUTO: 0.58 10*3/MM3 (ref 0–0.4)
EOSINOPHIL NFR BLD AUTO: 4.2 % (ref 0.3–6.2)
ERYTHROCYTE [DISTWIDTH] IN BLOOD BY AUTOMATED COUNT: 12 % (ref 12.3–15.4)
GLUCOSE SERPL-MCNC: 137 MG/DL (ref 65–99)
HCT VFR BLD AUTO: 42.6 % (ref 37.5–51)
HGB BLD-MCNC: 14.6 G/DL (ref 13–17.7)
IMM GRANULOCYTES # BLD AUTO: 0.07 10*3/MM3 (ref 0–0.05)
IMM GRANULOCYTES NFR BLD AUTO: 0.5 % (ref 0–0.5)
LYMPHOCYTES # BLD AUTO: 2.14 10*3/MM3 (ref 0.7–3.1)
LYMPHOCYTES NFR BLD AUTO: 15.6 % (ref 19.6–45.3)
MCH RBC QN AUTO: 33.2 PG (ref 26.6–33)
MCHC RBC AUTO-ENTMCNC: 34.3 G/DL (ref 31.5–35.7)
MCV RBC AUTO: 96.8 FL (ref 79–97)
MONOCYTES # BLD AUTO: 1.46 10*3/MM3 (ref 0.1–0.9)
MONOCYTES NFR BLD AUTO: 10.6 % (ref 5–12)
NEUTROPHILS NFR BLD AUTO: 68.1 % (ref 42.7–76)
NEUTROPHILS NFR BLD AUTO: 9.33 10*3/MM3 (ref 1.7–7)
NRBC BLD AUTO-RTO: 0 /100 WBC (ref 0–0.2)
PLATELET # BLD AUTO: 257 10*3/MM3 (ref 140–450)
PMV BLD AUTO: 8.8 FL (ref 6–12)
POTASSIUM SERPL-SCNC: 4 MMOL/L (ref 3.5–5.2)
QT INTERVAL: 392 MS
QTC INTERVAL: 423 MS
RBC # BLD AUTO: 4.4 10*6/MM3 (ref 4.14–5.8)
SODIUM SERPL-SCNC: 136 MMOL/L (ref 136–145)
WBC NRBC COR # BLD AUTO: 13.72 10*3/MM3 (ref 3.4–10.8)

## 2024-05-22 PROCEDURE — 76000 FLUOROSCOPY <1 HR PHYS/QHP: CPT

## 2024-05-22 PROCEDURE — 25810000003 LACTATED RINGERS PER 1000 ML: Performed by: ANESTHESIOLOGY

## 2024-05-22 PROCEDURE — 0SRB0J9 REPLACEMENT OF LEFT HIP JOINT WITH SYNTHETIC SUBSTITUTE, CEMENTED, OPEN APPROACH: ICD-10-PCS | Performed by: ORTHOPAEDIC SURGERY

## 2024-05-22 PROCEDURE — 94799 UNLISTED PULMONARY SVC/PX: CPT

## 2024-05-22 PROCEDURE — 25010000002 DEXAMETHASONE SODIUM PHOSPHATE 20 MG/5ML SOLUTION: Performed by: ANESTHESIOLOGY

## 2024-05-22 PROCEDURE — 80048 BASIC METABOLIC PNL TOTAL CA: CPT | Performed by: INTERNAL MEDICINE

## 2024-05-22 PROCEDURE — 25010000002 HYDROMORPHONE PER 4 MG: Performed by: INTERNAL MEDICINE

## 2024-05-22 PROCEDURE — 94760 N-INVAS EAR/PLS OXIMETRY 1: CPT

## 2024-05-22 PROCEDURE — 25810000003 SODIUM CHLORIDE 0.9 % SOLUTION: Performed by: INTERNAL MEDICINE

## 2024-05-22 PROCEDURE — 25010000002 FENTANYL CITRATE (PF) 50 MCG/ML SOLUTION: Performed by: NURSE ANESTHETIST, CERTIFIED REGISTERED

## 2024-05-22 PROCEDURE — 85025 COMPLETE CBC W/AUTO DIFF WBC: CPT | Performed by: INTERNAL MEDICINE

## 2024-05-22 PROCEDURE — 73501 X-RAY EXAM HIP UNI 1 VIEW: CPT

## 2024-05-22 PROCEDURE — 94761 N-INVAS EAR/PLS OXIMETRY MLT: CPT

## 2024-05-22 PROCEDURE — 25010000002 ACETAMINOPHEN 10 MG/ML SOLUTION: Performed by: NURSE ANESTHETIST, CERTIFIED REGISTERED

## 2024-05-22 PROCEDURE — 25010000002 CEFAZOLIN PER 500 MG: Performed by: ORTHOPAEDIC SURGERY

## 2024-05-22 PROCEDURE — 36415 COLL VENOUS BLD VENIPUNCTURE: CPT | Performed by: INTERNAL MEDICINE

## 2024-05-22 PROCEDURE — C1776 JOINT DEVICE (IMPLANTABLE): HCPCS | Performed by: ORTHOPAEDIC SURGERY

## 2024-05-22 PROCEDURE — 25010000002 CLONIDINE PER 1 MG: Performed by: ORTHOPAEDIC SURGERY

## 2024-05-22 PROCEDURE — 25010000002 SUGAMMADEX 200 MG/2ML SOLUTION: Performed by: ANESTHESIOLOGY

## 2024-05-22 PROCEDURE — 25810000003 SODIUM CHLORIDE 0.9 % SOLUTION: Performed by: ORTHOPAEDIC SURGERY

## 2024-05-22 PROCEDURE — 25010000002 ROPIVACAINE PER 1 MG: Performed by: ORTHOPAEDIC SURGERY

## 2024-05-22 PROCEDURE — 25010000002 PHENYLEPHRINE 10 MG/ML SOLUTION: Performed by: NURSE ANESTHETIST, CERTIFIED REGISTERED

## 2024-05-22 PROCEDURE — 25010000002 PROPOFOL 10 MG/ML EMULSION: Performed by: NURSE ANESTHETIST, CERTIFIED REGISTERED

## 2024-05-22 PROCEDURE — 25010000002 FENTANYL CITRATE (PF) 50 MCG/ML SOLUTION: Performed by: ANESTHESIOLOGY

## 2024-05-22 PROCEDURE — 25010000002 ONDANSETRON PER 1 MG: Performed by: ANESTHESIOLOGY

## 2024-05-22 PROCEDURE — 94664 DEMO&/EVAL PT USE INHALER: CPT

## 2024-05-22 DEVICE — DEV CONTRL TISS STRATAFIX SPIRAL PDS PLS CT1 2-0 45CM: Type: IMPLANTABLE DEVICE | Site: HIP | Status: FUNCTIONAL

## 2024-05-22 DEVICE — SUT FW #2 W/TPR NDL 1/2 CIR 38IN 97CM 26.5MM BLU: Type: IMPLANTABLE DEVICE | Site: HIP | Status: FUNCTIONAL

## 2024-05-22 DEVICE — CP HIP UPCHRG OSSEOTI LTD HL CUPS: Type: IMPLANTABLE DEVICE | Status: FUNCTIONAL

## 2024-05-22 DEVICE — SHLL ACET OSSEOTI G7 4H SZG 58MM: Type: IMPLANTABLE DEVICE | Site: HIP | Status: FUNCTIONAL

## 2024-05-22 DEVICE — HEMOST ABS SURGIFOAM SZ100 8X12 10MM: Type: IMPLANTABLE DEVICE | Site: HIP | Status: FUNCTIONAL

## 2024-05-22 DEVICE — STEM FEM/HIP AVENIR/COMPLETE HI/OFFST HA SZ7: Type: IMPLANTABLE DEVICE | Site: HIP | Status: FUNCTIONAL

## 2024-05-22 DEVICE — TOTAL HIP PRIMARY: Type: IMPLANTABLE DEVICE | Status: FUNCTIONAL

## 2024-05-22 DEVICE — DEV CONTRL TISS STRATAFIX SPIRAL MNCRYL UD 3/0 PLS 30CM: Type: IMPLANTABLE DEVICE | Site: HIP | Status: FUNCTIONAL

## 2024-05-22 DEVICE — BIOLOX® DELTA, CERAMIC FEMORAL HEAD, M, Ø 36/0, TAPER 12/14
Type: IMPLANTABLE DEVICE | Site: HIP | Status: FUNCTIONAL
Brand: BIOLOX® DELTA

## 2024-05-22 DEVICE — LINER ACET G7 VIVACIT/E NTRL HXPE SZG 36MM: Type: IMPLANTABLE DEVICE | Site: HIP | Status: FUNCTIONAL

## 2024-05-22 RX ORDER — SODIUM CHLORIDE 0.9 % (FLUSH) 0.9 %
3 SYRINGE (ML) INJECTION EVERY 12 HOURS SCHEDULED
Status: DISCONTINUED | OUTPATIENT
Start: 2024-05-22 | End: 2024-05-22 | Stop reason: HOSPADM

## 2024-05-22 RX ORDER — ONDANSETRON 2 MG/ML
4 INJECTION INTRAMUSCULAR; INTRAVENOUS ONCE AS NEEDED
Status: DISCONTINUED | OUTPATIENT
Start: 2024-05-22 | End: 2024-05-22 | Stop reason: HOSPADM

## 2024-05-22 RX ORDER — MAGNESIUM HYDROXIDE 1200 MG/15ML
LIQUID ORAL AS NEEDED
Status: DISCONTINUED | OUTPATIENT
Start: 2024-05-22 | End: 2024-05-22 | Stop reason: HOSPADM

## 2024-05-22 RX ORDER — LABETALOL HYDROCHLORIDE 5 MG/ML
5 INJECTION, SOLUTION INTRAVENOUS
Status: DISCONTINUED | OUTPATIENT
Start: 2024-05-22 | End: 2024-05-22 | Stop reason: HOSPADM

## 2024-05-22 RX ORDER — SODIUM CHLORIDE 9 MG/ML
100 INJECTION, SOLUTION INTRAVENOUS CONTINUOUS
Status: ACTIVE | OUTPATIENT
Start: 2024-05-22 | End: 2024-05-23

## 2024-05-22 RX ORDER — ACETAMINOPHEN 325 MG/1
325 TABLET ORAL EVERY 4 HOURS PRN
Status: DISCONTINUED | OUTPATIENT
Start: 2024-05-22 | End: 2024-05-25 | Stop reason: HOSPADM

## 2024-05-22 RX ORDER — ACETAMINOPHEN 10 MG/ML
INJECTION, SOLUTION INTRAVENOUS AS NEEDED
Status: DISCONTINUED | OUTPATIENT
Start: 2024-05-22 | End: 2024-05-22 | Stop reason: SURG

## 2024-05-22 RX ORDER — DROPERIDOL 2.5 MG/ML
0.62 INJECTION, SOLUTION INTRAMUSCULAR; INTRAVENOUS
Status: DISCONTINUED | OUTPATIENT
Start: 2024-05-22 | End: 2024-05-22 | Stop reason: HOSPADM

## 2024-05-22 RX ORDER — SODIUM CHLORIDE 0.9 % (FLUSH) 0.9 %
3-10 SYRINGE (ML) INJECTION AS NEEDED
Status: DISCONTINUED | OUTPATIENT
Start: 2024-05-22 | End: 2024-05-22 | Stop reason: HOSPADM

## 2024-05-22 RX ORDER — ACETAMINOPHEN 10 MG/ML
1000 INJECTION, SOLUTION INTRAVENOUS ONCE
Status: CANCELLED | OUTPATIENT
Start: 2024-05-22 | End: 2024-05-22

## 2024-05-22 RX ORDER — DEXAMETHASONE SODIUM PHOSPHATE 4 MG/ML
INJECTION, SOLUTION INTRA-ARTICULAR; INTRALESIONAL; INTRAMUSCULAR; INTRAVENOUS; SOFT TISSUE AS NEEDED
Status: DISCONTINUED | OUTPATIENT
Start: 2024-05-22 | End: 2024-05-22 | Stop reason: SURG

## 2024-05-22 RX ORDER — PHENYLEPHRINE HYDROCHLORIDE 10 MG/ML
INJECTION INTRAVENOUS AS NEEDED
Status: DISCONTINUED | OUTPATIENT
Start: 2024-05-22 | End: 2024-05-22 | Stop reason: SURG

## 2024-05-22 RX ORDER — TRANEXAMIC ACID 10 MG/ML
1000 INJECTION, SOLUTION INTRAVENOUS ONCE
Status: COMPLETED | OUTPATIENT
Start: 2024-05-22 | End: 2024-05-22

## 2024-05-22 RX ORDER — ONDANSETRON 2 MG/ML
INJECTION INTRAMUSCULAR; INTRAVENOUS AS NEEDED
Status: DISCONTINUED | OUTPATIENT
Start: 2024-05-22 | End: 2024-05-22 | Stop reason: SURG

## 2024-05-22 RX ORDER — DOCUSATE SODIUM 100 MG/1
100 CAPSULE, LIQUID FILLED ORAL 2 TIMES DAILY
Status: DISCONTINUED | OUTPATIENT
Start: 2024-05-22 | End: 2024-05-25 | Stop reason: HOSPADM

## 2024-05-22 RX ORDER — MIDAZOLAM HYDROCHLORIDE 1 MG/ML
0.5 INJECTION INTRAMUSCULAR; INTRAVENOUS
Status: DISCONTINUED | OUTPATIENT
Start: 2024-05-22 | End: 2024-05-22 | Stop reason: HOSPADM

## 2024-05-22 RX ORDER — CHLORHEXIDINE GLUCONATE 500 MG/1
CLOTH TOPICAL ONCE
Status: COMPLETED | OUTPATIENT
Start: 2024-05-22 | End: 2024-05-22

## 2024-05-22 RX ORDER — NALOXONE HCL 0.4 MG/ML
0.2 VIAL (ML) INJECTION AS NEEDED
Status: DISCONTINUED | OUTPATIENT
Start: 2024-05-22 | End: 2024-05-22 | Stop reason: HOSPADM

## 2024-05-22 RX ORDER — HYDROCODONE BITARTRATE AND ACETAMINOPHEN 5; 325 MG/1; MG/1
1 TABLET ORAL ONCE AS NEEDED
Status: DISCONTINUED | OUTPATIENT
Start: 2024-05-22 | End: 2024-05-22 | Stop reason: HOSPADM

## 2024-05-22 RX ORDER — IPRATROPIUM BROMIDE AND ALBUTEROL SULFATE 2.5; .5 MG/3ML; MG/3ML
3 SOLUTION RESPIRATORY (INHALATION) ONCE AS NEEDED
Status: DISCONTINUED | OUTPATIENT
Start: 2024-05-22 | End: 2024-05-22 | Stop reason: HOSPADM

## 2024-05-22 RX ORDER — DIPHENHYDRAMINE HYDROCHLORIDE 50 MG/ML
12.5 INJECTION INTRAMUSCULAR; INTRAVENOUS
Status: DISCONTINUED | OUTPATIENT
Start: 2024-05-22 | End: 2024-05-22 | Stop reason: HOSPADM

## 2024-05-22 RX ORDER — FENTANYL CITRATE 50 UG/ML
25 INJECTION, SOLUTION INTRAMUSCULAR; INTRAVENOUS
Status: DISCONTINUED | OUTPATIENT
Start: 2024-05-22 | End: 2024-05-22 | Stop reason: HOSPADM

## 2024-05-22 RX ORDER — PROMETHAZINE HYDROCHLORIDE 25 MG/1
25 TABLET ORAL ONCE AS NEEDED
Status: DISCONTINUED | OUTPATIENT
Start: 2024-05-22 | End: 2024-05-22 | Stop reason: HOSPADM

## 2024-05-22 RX ORDER — ROCURONIUM BROMIDE 10 MG/ML
INJECTION, SOLUTION INTRAVENOUS AS NEEDED
Status: DISCONTINUED | OUTPATIENT
Start: 2024-05-22 | End: 2024-05-22 | Stop reason: SURG

## 2024-05-22 RX ORDER — PROPOFOL 10 MG/ML
VIAL (ML) INTRAVENOUS AS NEEDED
Status: DISCONTINUED | OUTPATIENT
Start: 2024-05-22 | End: 2024-05-22 | Stop reason: SURG

## 2024-05-22 RX ORDER — PREGABALIN 75 MG/1
75 CAPSULE ORAL ONCE
Status: COMPLETED | OUTPATIENT
Start: 2024-05-22 | End: 2024-05-22

## 2024-05-22 RX ORDER — FENTANYL CITRATE 50 UG/ML
50 INJECTION, SOLUTION INTRAMUSCULAR; INTRAVENOUS ONCE AS NEEDED
Status: COMPLETED | OUTPATIENT
Start: 2024-05-22 | End: 2024-05-22

## 2024-05-22 RX ORDER — LIDOCAINE HYDROCHLORIDE 10 MG/ML
0.5 INJECTION, SOLUTION INFILTRATION; PERINEURAL ONCE AS NEEDED
Status: DISCONTINUED | OUTPATIENT
Start: 2024-05-22 | End: 2024-05-22 | Stop reason: HOSPADM

## 2024-05-22 RX ORDER — FLUMAZENIL 0.1 MG/ML
0.2 INJECTION INTRAVENOUS AS NEEDED
Status: DISCONTINUED | OUTPATIENT
Start: 2024-05-22 | End: 2024-05-22 | Stop reason: HOSPADM

## 2024-05-22 RX ORDER — IPRATROPIUM BROMIDE AND ALBUTEROL SULFATE 2.5; .5 MG/3ML; MG/3ML
3 SOLUTION RESPIRATORY (INHALATION) EVERY 6 HOURS PRN
Status: DISCONTINUED | OUTPATIENT
Start: 2024-05-22 | End: 2024-05-25 | Stop reason: HOSPADM

## 2024-05-22 RX ORDER — FENTANYL CITRATE 50 UG/ML
INJECTION, SOLUTION INTRAMUSCULAR; INTRAVENOUS AS NEEDED
Status: DISCONTINUED | OUTPATIENT
Start: 2024-05-22 | End: 2024-05-22 | Stop reason: SURG

## 2024-05-22 RX ORDER — SODIUM CHLORIDE, SODIUM LACTATE, POTASSIUM CHLORIDE, CALCIUM CHLORIDE 600; 310; 30; 20 MG/100ML; MG/100ML; MG/100ML; MG/100ML
9 INJECTION, SOLUTION INTRAVENOUS CONTINUOUS
Status: DISCONTINUED | OUTPATIENT
Start: 2024-05-22 | End: 2024-05-22

## 2024-05-22 RX ORDER — HYDROMORPHONE HYDROCHLORIDE 1 MG/ML
0.25 INJECTION, SOLUTION INTRAMUSCULAR; INTRAVENOUS; SUBCUTANEOUS
Status: DISCONTINUED | OUTPATIENT
Start: 2024-05-22 | End: 2024-05-22 | Stop reason: HOSPADM

## 2024-05-22 RX ORDER — HYDRALAZINE HYDROCHLORIDE 20 MG/ML
5 INJECTION INTRAMUSCULAR; INTRAVENOUS
Status: DISCONTINUED | OUTPATIENT
Start: 2024-05-22 | End: 2024-05-22 | Stop reason: HOSPADM

## 2024-05-22 RX ORDER — PROMETHAZINE HYDROCHLORIDE 25 MG/1
25 SUPPOSITORY RECTAL ONCE AS NEEDED
Status: DISCONTINUED | OUTPATIENT
Start: 2024-05-22 | End: 2024-05-22 | Stop reason: HOSPADM

## 2024-05-22 RX ORDER — HYDROCODONE BITARTRATE AND ACETAMINOPHEN 7.5; 325 MG/1; MG/1
1 TABLET ORAL EVERY 4 HOURS PRN
Status: DISCONTINUED | OUTPATIENT
Start: 2024-05-22 | End: 2024-05-22 | Stop reason: HOSPADM

## 2024-05-22 RX ORDER — FAMOTIDINE 10 MG/ML
20 INJECTION, SOLUTION INTRAVENOUS ONCE
Status: COMPLETED | OUTPATIENT
Start: 2024-05-22 | End: 2024-05-22

## 2024-05-22 RX ORDER — EPHEDRINE SULFATE 50 MG/ML
5 INJECTION, SOLUTION INTRAVENOUS ONCE AS NEEDED
Status: DISCONTINUED | OUTPATIENT
Start: 2024-05-22 | End: 2024-05-22 | Stop reason: HOSPADM

## 2024-05-22 RX ADMIN — OXYCODONE AND ACETAMINOPHEN 1 TABLET: 7.5; 325 TABLET ORAL at 06:14

## 2024-05-22 RX ADMIN — IPRATROPIUM BROMIDE AND ALBUTEROL SULFATE 3 ML: 2.5; .5 SOLUTION RESPIRATORY (INHALATION) at 23:07

## 2024-05-22 RX ADMIN — IPRATROPIUM BROMIDE AND ALBUTEROL SULFATE 3 ML: 2.5; .5 SOLUTION RESPIRATORY (INHALATION) at 07:42

## 2024-05-22 RX ADMIN — FENTANYL CITRATE 50 MCG: 50 INJECTION, SOLUTION INTRAMUSCULAR; INTRAVENOUS at 14:41

## 2024-05-22 RX ADMIN — PHENYLEPHRINE HYDROCHLORIDE 100 MCG: 10 INJECTION INTRAVENOUS at 15:56

## 2024-05-22 RX ADMIN — SUGAMMADEX 200 MG: 100 INJECTION, SOLUTION INTRAVENOUS at 17:39

## 2024-05-22 RX ADMIN — PROPOFOL 130 MG: 10 INJECTION, EMULSION INTRAVENOUS at 15:31

## 2024-05-22 RX ADMIN — ROCURONIUM BROMIDE 40 MG: 10 INJECTION, SOLUTION INTRAVENOUS at 15:31

## 2024-05-22 RX ADMIN — PHENYLEPHRINE HYDROCHLORIDE 100 MCG: 10 INJECTION INTRAVENOUS at 17:17

## 2024-05-22 RX ADMIN — SODIUM CHLORIDE 100 ML/HR: 9 INJECTION, SOLUTION INTRAVENOUS at 21:47

## 2024-05-22 RX ADMIN — FENTANYL CITRATE 25 MCG: 50 INJECTION, SOLUTION INTRAMUSCULAR; INTRAVENOUS at 18:25

## 2024-05-22 RX ADMIN — SODIUM CHLORIDE 2 G: 900 INJECTION INTRAVENOUS at 15:21

## 2024-05-22 RX ADMIN — PHENYLEPHRINE HYDROCHLORIDE 100 MCG: 10 INJECTION INTRAVENOUS at 15:44

## 2024-05-22 RX ADMIN — PREGABALIN 75 MG: 75 CAPSULE ORAL at 14:34

## 2024-05-22 RX ADMIN — SODIUM CHLORIDE, POTASSIUM CHLORIDE, SODIUM LACTATE AND CALCIUM CHLORIDE 9 ML/HR: 600; 310; 30; 20 INJECTION, SOLUTION INTRAVENOUS at 14:36

## 2024-05-22 RX ADMIN — SODIUM CHLORIDE 2000 MG: 900 INJECTION INTRAVENOUS at 21:47

## 2024-05-22 RX ADMIN — PROPOFOL 30 MG: 10 INJECTION, EMULSION INTRAVENOUS at 16:01

## 2024-05-22 RX ADMIN — PHENYLEPHRINE HYDROCHLORIDE 100 MCG: 10 INJECTION INTRAVENOUS at 16:59

## 2024-05-22 RX ADMIN — PHENYLEPHRINE HYDROCHLORIDE 100 MCG: 10 INJECTION INTRAVENOUS at 17:26

## 2024-05-22 RX ADMIN — PHENYLEPHRINE HYDROCHLORIDE 100 MCG: 10 INJECTION INTRAVENOUS at 15:36

## 2024-05-22 RX ADMIN — Medication 10 ML: at 09:55

## 2024-05-22 RX ADMIN — SODIUM CHLORIDE 75 ML/HR: 9 INJECTION, SOLUTION INTRAVENOUS at 12:26

## 2024-05-22 RX ADMIN — DEXAMETHASONE SODIUM PHOSPHATE 4 MG: 4 INJECTION, SOLUTION INTRAMUSCULAR; INTRAVENOUS at 17:26

## 2024-05-22 RX ADMIN — PHENYLEPHRINE HYDROCHLORIDE 100 MCG: 10 INJECTION INTRAVENOUS at 16:41

## 2024-05-22 RX ADMIN — Medication 10 ML: at 21:41

## 2024-05-22 RX ADMIN — ONDANSETRON 4 MG: 2 INJECTION INTRAMUSCULAR; INTRAVENOUS at 17:19

## 2024-05-22 RX ADMIN — PHENYLEPHRINE HYDROCHLORIDE 100 MCG: 10 INJECTION INTRAVENOUS at 16:11

## 2024-05-22 RX ADMIN — ROCURONIUM BROMIDE 10 MG: 10 INJECTION, SOLUTION INTRAVENOUS at 16:22

## 2024-05-22 RX ADMIN — TRANEXAMIC ACID 1000 MG: 10 INJECTION, SOLUTION INTRAVENOUS at 15:50

## 2024-05-22 RX ADMIN — HYDROMORPHONE HYDROCHLORIDE 0.5 MG: 1 INJECTION, SOLUTION INTRAMUSCULAR; INTRAVENOUS; SUBCUTANEOUS at 09:54

## 2024-05-22 RX ADMIN — FAMOTIDINE 20 MG: 10 INJECTION INTRAVENOUS at 14:37

## 2024-05-22 RX ADMIN — ACETAMINOPHEN 1000 MG: 1000 INJECTION INTRAVENOUS at 15:47

## 2024-05-22 RX ADMIN — CHLORHEXIDINE GLUCONATE: 500 CLOTH TOPICAL at 14:41

## 2024-05-22 RX ADMIN — HYDROMORPHONE HYDROCHLORIDE 0.5 MG: 1 INJECTION, SOLUTION INTRAMUSCULAR; INTRAVENOUS; SUBCUTANEOUS at 04:16

## 2024-05-22 RX ADMIN — FENTANYL CITRATE 50 MCG: 50 INJECTION, SOLUTION INTRAMUSCULAR; INTRAVENOUS at 15:31

## 2024-05-22 NOTE — H&P
Patient Name:  Nickolas Drake  YOB: 1934  MRN:  [id number]  Admit Date:  5/21/2024  Patient Care Team:  Carmela Leija MD as PCP - General      Subjective   History Present Illness     Chief Complaint   Patient presents with    Fall    Hip Pain       Mr. Drake is a 89 y.o. with a history of CAD with stent in 2013, tobacco use that presents to [facility] complaining of hip pain after a mechanical fall. He was in normal state of health recently and today had a fall when walking from his recliner to the hallway. No syncope. Came to [facility] ER and found to have hip fx. Pain moderate, worse with movement, better with meds.     History of Present Illness  Review of Systems   Constitutional: Negative.    HENT: Negative.     Eyes: Negative.    Respiratory: Negative.     Cardiovascular: Negative.    Gastrointestinal: Negative.    Endocrine: Negative.    Genitourinary: Negative.    Musculoskeletal:  Positive for arthralgias and joint swelling.   Skin: Negative.    Allergic/Immunologic: Negative.    Neurological: Negative.    Hematological: Negative.    Psychiatric/Behavioral: Negative.  Negative for sleep disturbance. The patient is not nervous/anxious.         Personal History     Past Medical History:   Diagnosis Date    Atherosclerotic heart disease     s/p anterior ST elevation MI 12/12/2013: mid LAD Xience xpedition 2.5 x 23, 2.5 x 12 mm stent. Proximal LAD 20% stenosis. Mid LCx 20-30% stenosis. Mid RCA 20% stenosis.    CAD (coronary artery disease)     Dyslipidemia     Health care maintenance     Hyperlipidemia     Hypertension     Ischemic cardiomyopathy     Near syncope     ST elevation (STEMI) myocardial infarction      Past Surgical History:   Procedure Laterality Date    CARDIAC CATHETERIZATION Left 12/12/2013    [facility], selective coronary angiogram, PCI stent of mid LAD, left ventricular angiogram, Dr. Janet Snowden     Family History   Problem  Relation Age of Onset    No Known Problems Mother     No Known Problems Father      Social History     Tobacco Use    Smoking status: Every Day    Smokeless tobacco: Never    Tobacco comments:     approx 5 cig/day   Substance Use Topics    Alcohol use: No     Comment: rare  / caffeine use    Drug use: No     (Not in a hospital admission)    Allergies:    Allergies   Allergen Reactions    Other        Objective    Objective     Vital Signs  Temp:  [98.3 °F (36.8 °C)] 98.3 °F (36.8 °C)  Heart Rate:  [82] 82  Resp:  [18] 18  BP: (117)/(67) 117/67  SpO2:  [97 %] 97 %  on   ;   Device (Oxygen Therapy): room air  Body mass index is 23.71 kg/m².    Physical Exam  Vitals and nursing note reviewed.   Constitutional:       General: He is not in acute distress.     Appearance: He is well-developed. He is not diaphoretic.   HENT:      Head: Normocephalic and atraumatic.   Eyes:      General: No scleral icterus.     Conjunctiva/sclera: Conjunctivae normal.   Neck:      Vascular: No JVD.   Cardiovascular:      Rate and Rhythm: Normal rate and regular rhythm.      Heart sounds: Normal heart sounds. No murmur heard.  Pulmonary:      Effort: Pulmonary effort is normal. No respiratory distress.      Breath sounds: Normal breath sounds.   Abdominal:      General: Bowel sounds are normal. There is no distension.      Palpations: Abdomen is soft.      Tenderness: There is no abdominal tenderness.   Musculoskeletal:         General: Tenderness, deformity and signs of injury present. Normal range of motion.      Cervical back: Normal range of motion and neck supple.   Skin:     General: Skin is warm and dry.   Neurological:      Mental Status: He is alert and oriented to person, place, and time.      Cranial Nerves: No cranial nerve deficit.      Motor: No abnormal muscle tone.   Psychiatric:         Behavior: Behavior normal.         Thought Content: Thought content normal.         Results Review:  I reviewed the patient's new clinical  results.  Discussed with ED provider.    Lab Results (last 24 hours)       Procedure Component Value Units Date/Time    CBC & Differential [[id number]]  (Abnormal) Collected: 05/21/24 1854    Specimen: Blood Updated: 05/21/24 1923    Narrative:      The following orders were created for panel order CBC & Differential.  Procedure                               Abnormality         Status                     ---------                               -----------         ------                     CBC Auto Differential[[id number]]        Abnormal            Final result                 Please view results for these tests on the individual orders.    Comprehensive Metabolic Panel [[id number]]  (Abnormal) Collected: 05/21/24 1854    Specimen: Blood Updated: 05/21/24 1938     Glucose 108 mg/dL      BUN 19 mg/dL      Creatinine 1.15 mg/dL      Sodium 137 mmol/L      Potassium 4.6 mmol/L      Comment: Slight hemolysis detected by analyzer. Result may be falsely elevated.        Chloride 100 mmol/L      CO2 26.8 mmol/L      Calcium 8.6 mg/dL      Total Protein 6.3 g/dL      Albumin 3.4 g/dL      ALT (SGPT) 12 U/L      AST (SGOT) 26 U/L      Comment: Slight hemolysis detected by analyzer. Result may be falsely elevated.        Alkaline Phosphatase 110 U/L      Total Bilirubin 0.2 mg/dL      Globulin 2.9 gm/dL      A/G Ratio 1.2 g/dL      BUN/Creatinine Ratio 16.5     Anion Gap 10.2 mmol/L      eGFR 60.8 mL/min/1.73     Narrative:      GFR Normal >60  Chronic Kidney Disease <60  Kidney Failure <15    The GFR formula is only valid for adults with stable renal function between ages 18 and 70.    CBC Auto Differential [[id number]]  (Abnormal) Collected: 05/21/24 1854    Specimen: Blood Updated: 05/21/24 1923     WBC 15.15 10*3/mm3      RBC 4.04 10*6/mm3      Hemoglobin 13.5 g/dL      Hematocrit 38.8 %      MCV 96.0 fL      MCH 33.4 pg      MCHC 34.8 g/dL      RDW 12.2 %      RDW-SD 41.9 fl      MPV 9.3 fL      Platelets 277 10*3/mm3       Neutrophil % 72.8 %      Lymphocyte % 13.0 %      Monocyte % 9.1 %      Eosinophil % 3.5 %      Basophil % 0.9 %      Immature Grans % 0.7 %      Neutrophils, Absolute 11.03 10*3/mm3      Lymphocytes, Absolute 1.97 10*3/mm3      Monocytes, Absolute 1.38 10*3/mm3      Eosinophils, Absolute 0.53 10*3/mm3      Basophils, Absolute 0.14 10*3/mm3      Immature Grans, Absolute 0.10 10*3/mm3      nRBC 0.0 /100 WBC             Imaging Results (Last 24 Hours)       Procedure Component Value Units Date/Time    XR Chest 1 View [[id number]] Collected: 05/21/24 1924     Updated: 05/21/24 1928    Narrative:      XR CHEST 1 VW-5/21/2024     HISTORY: Preop.     Heart size is within normal limits. There is mild interstitial  prominence of the lungs including somewhat confluent area of increased  density in the right base which is slightly more prominent than on the  3/18/2029 study and could represent some progression of interstitial  fibrosis versus some mild atelectasis or developing pneumonia. Lungs are  otherwise free of acute infiltrates.       Impression:      1. Small area of increased density in the right lung base may represent  chronic parenchymal change versus mild atelectasis or pneumonia.  2. No other acute infiltrates are seen.        This report was finalized on 5/21/2024 7:24 PM by Dr. Chris Swartz M.D on Workstation: [id number]       XR Hip With or Without Pelvis 2 - 3 View Left [[id number]] Collected: 05/21/24 1851     Updated: 05/21/24 1858    Narrative:      XR HIP W OR WO PELVIS 2-3 VIEW LEFT-5/21/2024     HISTORY: Fell, left hip pain.     There is a mildly displaced fracture of the left femoral neck. Femoral  head articulates with the acetabulum. No other fractures are seen in the  pelvis.       Impression:      1. Left femoral neck fracture.        This report was finalized on 5/21/2024 6:55 PM by Dr. Chris Swartz M.D on Workstation: [id number]               Results for orders placed in visit on  04/30/14    SCANNED - ECHOCARDIOGRAM      ECG 12 Lead Pre-Op / Pre-Procedure   Preliminary Result   HEART RATE= 70  bpm   RR Interval= 857  ms   NC Interval= 187  ms   P Horizontal Axis= -7  deg   P Front Axis= 9  deg   QRSD Interval= 102  ms   QT Interval= 392  ms   QTcB= 423  ms   QRS Axis= -35  deg   T Wave Axis= 65  deg   - BORDERLINE ECG -   Sinus rhythm   Ventricular premature complex   Left axis deviation   Low voltage, precordial leads   Consider anterior infarct   Electronically Signed By:    Date and Time of Study: 2024-05-21 19:03:17           Assessment/Plan     Active Hospital Problems    Diagnosis  POA    **Closed left hip fracture [S72.002A]  Yes    DNR (do not resuscitate) [Z66]  Unknown    Tobacco use [Z72.0]  Yes    Coronary artery disease involving native coronary artery of native heart without angina pectoris [I25.10]  Yes    Essential hypertension [I10]  Yes         PRN agents for pain, Orthopedic consultation, NPO aftermidnight for likely OR tomorrow  H/o CAD s/p stent 2013. On chronic asa. No current CP and no CP with exertion. Baseline EKG obtained. No additional cardiac testing indicated prior to urgent surgery ok to proceed to OR and will monitor him perioperatively. Continue statin, asa post op.   H/o tobacco use but no listed h/o COPD. Will have some bronchodilators perioperative, nicotine patch.   Leukocytosis likely reactive, no symptoms of infection, monitor   I discussed the patients findings and my recommendations with patient and nursing staff.    VTE Prophylaxis - SCDs. ASA  Code Status - DNR/DNI confirmed with patient        Oli Pereira MD  [facility]  05/21/24  20:50 EDT

## 2024-05-22 NOTE — CONSULTS
Orthopedic Consult    Patient: Nickolas Drake                                           YOB: 1934     Date of Admission: 5/21/2024  5:58 PM            Medical Record Number: [id number]    Attending Physician: Abe Murillo MD    Consulting Physician: Leonela Sherwood MD    Reason for Consult: Left hip fracture.    History of Present Illness: 89 y.o. male admitted to [facility], [place] with Closed left hip fracture [S72.002A]  Closed fracture of neck of left femur, initial encounter [S72.002A]  Skin tear of left elbow without complication, initial encounter [S51.012A].     The patient was evaluated in the emergency room and was diagnosed with a  hip fracture.   Secondary to the age / multiple medical co morbidities the patient was admitted to the hospitalist.   As I was on call for the emergency room I was consulted for further evaluation and treatment.     The patient was in the usual state of health and fell from standing height in his house, Resulting in sudden onset hip pain and inability to ambulate.   Denies any history of loss of consciousness, headache, vomiting, or seizures.   Denies any other injuries.   The patient is accompanied by  family members  to this hospital visit.     The patient denies any prior  pre-existing pain in the hip.  Patient is a  home ambulator. Patient  uses walker/cane assistive device.   The patient  lives at home , is quite active and independent in activities of daily living.  The patient denies history of dementia.    Past medical history, past surgical history, social history, family history, ALLERGIES, current medications have been reviewed by me.    Past Medical History:   Diagnosis Date    Atherosclerotic heart disease     s/p anterior ST elevation MI 12/12/2013: mid LAD Xience xpedition 2.5 x 23, 2.5 x 12 mm stent. Proximal LAD 20% stenosis. Mid LCx 20-30% stenosis. Mid RCA 20% stenosis.    CAD (coronary artery disease)     Dyslipidemia      Health care maintenance     Hyperlipidemia     Hypertension     Ischemic cardiomyopathy     Near syncope     ST elevation (STEMI) myocardial infarction      Past Surgical History:   Procedure Laterality Date    CARDIAC CATHETERIZATION Left 12/12/2013    [facility] coronary angiogram, PCI stent of mid LAD, left ventricular angiogram, Dr. Janet Snowden     Social History     Occupational History    Not on file   Tobacco Use    Smoking status: Every Day    Smokeless tobacco: Never    Tobacco comments:     approx 5 cig/day   Vaping Use    Vaping status: Never Used   Substance and Sexual Activity    Alcohol use: No     Comment: rare  / caffeine use    Drug use: No    Sexual activity: Defer      Social History     Social History Narrative    Not on file     Family History   Problem Relation Age of Onset    No Known Problems Mother     No Known Problems Father         Allergies   Allergen Reactions    Other        Home Medications:  Medications Prior to Admission   Medication Sig Dispense Refill Last Dose    aspirin 81 MG tablet Take 1 tablet by mouth Daily.   Past Week    Omega-3 Fatty Acids (FISH OIL) 1000 MG capsule capsule Take 1 capsule by mouth Daily.   5/21/2024    Multiple Vitamins-Minerals (PRESERVISION AREDS 2 PO) Take  by mouth.          Current Medications:  Scheduled Meds:[START ON 5/23/2024] aspirin, 81 mg, Oral, BID  atorvastatin, 20 mg, Oral, Nightly  ipratropium-albuterol, 3 mL, Nebulization, BID - RT  nicotine, 1 patch, Transdermal, Q24H  senna-docusate sodium, 2 tablet, Oral, BID  sodium chloride, 10 mL, Intravenous, Q12H      Continuous Infusions:sodium chloride, 75 mL/hr, Last Rate: 75 mL/hr (05/21/24 2205)      PRN Meds:.  acetaminophen **OR** acetaminophen **OR** acetaminophen    senna-docusate sodium **AND** polyethylene glycol **AND** bisacodyl **AND** bisacodyl    Calcium Replacement - Follow Nurse / BPA Driven Protocol    HYDROmorphone **AND** naloxone     "ipratropium-albuterol    Magnesium Standard Dose Replacement - Follow Nurse / BPA Driven Protocol    nicotine polacrilex    ondansetron ODT **OR** ondansetron    oxyCODONE-acetaminophen    Phosphorus Replacement - Follow Nurse / BPA Driven Protocol    Potassium Replacement - Follow Nurse / BPA Driven Protocol    sodium chloride    sodium chloride    Review of Systems:   A 12 point system review was reviewed with the patient and from the chart  and is negative except as in history of present illness.      Physical Exam: 89 y.o. male                    Vitals:    05/21/24 2059 05/21/24 2150 05/21/24 2235 05/22/24 0508   BP: 145/64 150/75  130/77   BP Location: Right arm Right arm  Right arm   Patient Position: Lying Lying  Lying   Pulse: 66 70 69 73   Resp: 18 18 18 18   Temp:  98.2 °F (36.8 °C)  98 °F (36.7 °C)   TempSrc:  Oral  Oral   SpO2: 96% 94% 95% 94%   Weight:  79 kg (174 lb 1.6 oz)     Height:  180.3 cm (71\")          Gait: Could not be tested , patient is nonambulatory.    Mental/HEENT/cardio/skin: The patient's general appearance was well-nourished, well-hydrated, no acute distress.  Orientation was alert and oriented ×3.  The patient's mood was normal.   Pulmonary exam shows normal late exchange, no labored breathing, or shortness of breath.    The skin exam showed normal temperature and color in the area of examination.    Extremities: LEFT   lower extremity positive shortening, positive external rotation, attempted movements of the  hip are painful and restricted. The patient is able to do gentle active range of motion of her toes. Gross sensation is intact over the toes.    Pulses: Pulses palpable and equal bilaterally    Diagnostic Tests:    Results from last 7 days   Lab Units 05/22/24  0437 05/21/24  1854   WBC 10*3/mm3 13.72* 15.15*   HEMOGLOBIN g/dL 14.6 13.5   HEMATOCRIT % 42.6 38.8   PLATELETS 10*3/mm3 257 277     Results from last 7 days   Lab Units 05/22/24  0437 05/21/24  1854   SODIUM mmol/L " "136 137   POTASSIUM mmol/L 4.0 4.6   CHLORIDE mmol/L 101 100   CO2 mmol/L 26.0 26.8   BUN mg/dL 18 19   CREATININE mg/dL 1.01 1.15   GLUCOSE mg/dL 137* 108*   CALCIUM mg/dL 8.2* 8.6         No results found for: \"URICACID\"  No results found for: \"CRYSTAL\"  Microbiology Results (last 10 days)       ** No results found for the last 240 hours. **          XR Chest 1 View    Result Date: 5/21/2024  1. Small area of increased density in the right lung base may represent chronic parenchymal change versus mild atelectasis or pneumonia. 2. No other acute infiltrates are seen.   This report was finalized on 5/21/2024 7:24 PM by Dr. Chris Swarzt M.D on Workstation: [id number]      XR Hip With or Without Pelvis 2 - 3 View Left    Result Date: 5/21/2024  1. Left femoral neck fracture.   This report was finalized on 5/21/2024 6:55 PM by Dr. Chris Swartz M.D on Workstation: [id number]       Assessment: LEFT  Intracapsular Hip Fracture. Rigoberto type 4      Closed left hip fracture    Essential hypertension    Coronary artery disease involving native coronary artery of native heart without angina pectoris    Tobacco use    DNR (do not resuscitate)      Plan:    Options and alternatives have been discussed in detail with patient and  family.   The patient is indicated for a  partial or total hip arthroplasty.    The likely,  Risks and benefits of the procedure including but not limited to infection, DVT, pulmonary embolism,  leg length discrepancy, recurrent dislocation, possibility of injury to nerves or vessels, possibility of periprosthetic fractures have been discussed in detail.  Despite the risks involved, The patient and patient's family  would like to proceed.     The patient is being scheduled for a left total hip arthroplasty by anterior approach at [facility] tentatively for May 22,2024.     Patient will be made NPO.   Obtain informed consent.     The patient's admitting service has seen the patient and the " patient is cleared to the operating room.    He does have a history of coronary artery disease but no modifiable risks and has been scheduled for the surgery without any further cardiac evaluation.    Date: 5/22/2024    Leonela Sherwood MD    CC: Carmela Leija MD; MD Lidia Hartmann Patrick D, MD

## 2024-05-22 NOTE — ANESTHESIA PROCEDURE NOTES
Airway  Date/Time: 5/22/2024 3:35 PM    General Information and Staff    Anesthesiologist: Yovani Zavala MD  CRNA/CAA: Cindy Gillespie CRNA    Indications and Patient Condition  Indications for airway management: airway protection    Preoxygenated: yes  MILS maintained throughout  Mask difficulty assessment: 1 - vent by mask    Final Airway Details  Final airway type: endotracheal airway      Successful airway: ETT  Cuffed: yes   Successful intubation technique: direct laryngoscopy  Facilitating devices/methods: intubating stylet  Endotracheal tube insertion site: oral  Blade: Luis  Blade size: 4  ETT size (mm): 7.5  Cormack-Lehane Classification: grade I - full view of glottis  Placement verified by: chest auscultation and capnometry   Measured from: lips  ETT/EBT  to lips (cm): 20  Number of attempts at approach: 1  Assessment: lips, teeth, and gum same as pre-op and atraumatic intubation

## 2024-05-22 NOTE — ED NOTES
"Nursing report ED to floor  Nickolas Drake  89 y.o.  male    HPI :  HPI (Adult)  Stated Reason for Visit: fall  History Obtained From: patient, EMS    Chief Complaint  Chief Complaint   Patient presents with    Fall    Hip Pain       Admitting doctor:   Oli Pereira MD    Admitting diagnosis:   The primary encounter diagnosis was Closed fracture of neck of left femur, initial encounter. A diagnosis of Skin tear of left elbow without complication, initial encounter was also pertinent to this visit.    Code status:   Current Code Status       Date Active Code Status Order ID Comments User Context       5/21/2024 2052 No CPR (Do Not Attempt to Resuscitate) [id number]  Oli Pereira MD ED        Question Answer    Code Status (Patient has no pulse and is not breathing) No CPR (Do Not Attempt to Resuscitate)    Medical Interventions (Patient has pulse or is breathing) Limited Support    Medical Intervention Limits: NO cardioversion     NO intubation (DNI)                    Allergies:   Other    Isolation:   No active isolations    Intake and Output  No intake or output data in the 24 hours ending 05/21/24 2100    Weight:       05/21/24 1759   Weight: 77.1 kg (170 lb)       Most recent vitals:   Vitals:    05/21/24 1759 05/21/24 2059   BP: 117/67 145/64   BP Location:  Right arm   Patient Position:  Lying   Pulse: 82 66   Resp: 18 18   Temp: 98.3 °F (36.8 °C)    SpO2: 97% 96%   Weight: 77.1 kg (170 lb)    Height: 180.3 cm (71\")        Active LDAs/IV Access:   Lines, Drains & Airways       Active LDAs       Name Placement date Placement time Site Days    Peripheral IV 05/21/24 1801 Right Forearm 05/21/24  1801  Forearm  less than 1                    Labs (abnormal labs have a star):   Labs Reviewed   COMPREHENSIVE METABOLIC PANEL - Abnormal; Notable for the following components:       Result Value    Glucose 108 (*)     Albumin 3.4 (*)     All other components within normal limits    Narrative:     GFR " Normal >60  Chronic Kidney Disease <60  Kidney Failure <15    The GFR formula is only valid for adults with stable renal function between ages 18 and 70.   CBC WITH AUTO DIFFERENTIAL - Abnormal; Notable for the following components:    WBC 15.15 (*)     RBC 4.04 (*)     MCH 33.4 (*)     RDW 12.2 (*)     Lymphocyte % 13.0 (*)     Immature Grans % 0.7 (*)     Neutrophils, Absolute 11.03 (*)     Monocytes, Absolute 1.38 (*)     Eosinophils, Absolute 0.53 (*)     Immature Grans, Absolute 0.10 (*)     All other components within normal limits   CBC AND DIFFERENTIAL    Narrative:     The following orders were created for panel order CBC & Differential.  Procedure                               Abnormality         Status                     ---------                               -----------         ------                     CBC Auto Differential[[id number]]        Abnormal            Final result                 Please view results for these tests on the individual orders.       EKG:   ECG 12 Lead Pre-Op / Pre-Procedure   Preliminary Result   HEART RATE= 70  bpm   RR Interval= 857  ms   VA Interval= 187  ms   P Horizontal Axis= -7  deg   P Front Axis= 9  deg   QRSD Interval= 102  ms   QT Interval= 392  ms   QTcB= 423  ms   QRS Axis= -35  deg   T Wave Axis= 65  deg   - BORDERLINE ECG -   Sinus rhythm   Ventricular premature complex   Left axis deviation   Low voltage, precordial leads   Consider anterior infarct   Electronically Signed By:    Date and Time of Study: 2024-05-21 19:03:17          Meds given in ED:   Medications   sodium chloride 0.9 % flush 10 mL (has no administration in time range)   sodium chloride 0.9 % flush 10 mL (has no administration in time range)   sodium chloride 0.9 % infusion 40 mL (has no administration in time range)   ondansetron ODT (ZOFRAN-ODT) disintegrating tablet 4 mg (has no administration in time range)     Or   ondansetron (ZOFRAN) injection 4 mg (has no administration in time range)    sodium chloride 0.9 % infusion (has no administration in time range)   Potassium Replacement - Follow Nurse / BPA Driven Protocol (has no administration in time range)   Magnesium Standard Dose Replacement - Follow Nurse / BPA Driven Protocol (has no administration in time range)   Phosphorus Replacement - Follow Nurse / BPA Driven Protocol (has no administration in time range)   Calcium Replacement - Follow Nurse / BPA Driven Protocol (has no administration in time range)   acetaminophen (TYLENOL) tablet 650 mg (has no administration in time range)     Or   acetaminophen (TYLENOL) 160 MG/5ML oral solution 650 mg (has no administration in time range)     Or   acetaminophen (TYLENOL) suppository 650 mg (has no administration in time range)   oxyCODONE-acetaminophen (PERCOCET) 7.5-325 MG per tablet 1 tablet (has no administration in time range)   HYDROmorphone (DILAUDID) injection 0.5 mg (has no administration in time range)     And   naloxone (NARCAN) injection 0.4 mg (has no administration in time range)   sennosides-docusate (PERICOLACE) 8.6-50 MG per tablet 2 tablet (has no administration in time range)     And   polyethylene glycol (MIRALAX) packet 17 g (has no administration in time range)     And   bisacodyl (DULCOLAX) EC tablet 5 mg (has no administration in time range)     And   bisacodyl (DULCOLAX) suppository 10 mg (has no administration in time range)   nicotine (NICODERM CQ) 21 MG/24HR patch 1 patch (has no administration in time range)   nicotine polacrilex (NICORETTE) gum 2 mg (has no administration in time range)   ipratropium-albuterol (DUO-NEB) nebulizer solution 3 mL (has no administration in time range)   ipratropium-albuterol (DUO-NEB) nebulizer solution 3 mL (has no administration in time range)   morphine injection 2 mg (2 mg Intravenous Given 5/21/24 0062)       Imaging results:  XR Chest 1 View    Result Date: 5/21/2024  1. Small area of increased density in the right lung base may represent  chronic parenchymal change versus mild atelectasis or pneumonia. 2. No other acute infiltrates are seen.   This report was finalized on 5/21/2024 7:24 PM by Dr. Chris Swartz M.D on Workstation: [id number]      XR Hip With or Without Pelvis 2 - 3 View Left    Result Date: 5/21/2024  1. Left femoral neck fracture.   This report was finalized on 5/21/2024 6:55 PM by Dr. Chris Swartz M.D on Workstation: [id number]       Ambulatory status:   - bedrest    Social issues:   Social History     Socioeconomic History    Marital status:    Tobacco Use    Smoking status: Every Day    Smokeless tobacco: Never    Tobacco comments:     approx 5 cig/day   Substance and Sexual Activity    Alcohol use: No     Comment: rare  / caffeine use    Drug use: No    Sexual activity: Defer       Peripheral Neurovascular  Peripheral Neurovascular (Adult)  Peripheral Neurovascular WDL: WDL    Neuro Cognitive  Neuro Cognitive (Adult)  Cognitive/Neuro/Behavioral WDL: WDL  Pupils  Pupil PERRLA: yes    Learning  Learning Assessment (Adult)  Learning Readiness and Ability: no barriers identified    Respiratory  Respiratory WDL  Respiratory WDL: WDL    Abdominal Pain       Pain Assessments  Pain (Adult)  (0-10) Pain Rating: Rest: 0  (0-10) Pain Rating: Activity: 0    NIH Stroke Scale       Lucrecia Pizano RN  05/21/24 21:00 EDT

## 2024-05-22 NOTE — OP NOTE
Operative  Note    Patient: Nickolas Drake  YOB: 1934    Medical Record Number: [id number]    Attending Physician: Abe Murillo MD    Date of Service: 5/22/2024     Pre-op Diagnosis:   Closed fracture of neck of left femur, initial encounter [S72.002A]    Post-Op Diagnosis Codes:     * Closed fracture of neck of left femur, initial encounter [S72.002A]    PROCEDURE PERFORMED: LEFT TOTAL HIP ARTHROPLASTY ANTERIOR WITH HANA TABLE by utilizing a Direct anterior approach with     Itzel  G 7 acetabular  Shell size 58 mm outer diameter   Neutral Polyethylene acetabular  liner- 36 mm internal diameter,   Avenir cementless  High offset Collar femoral stem size # 7    Delta ceramic femoral head- 36 mm outer diameter, + 0 neck length by utilizing a [place] table and image intensifier.   Implant Name Type Inv. Item Serial No.  Lot No. LRB No. Used Action   DEV CONTRL TISS STRATAFIX SPIRAL MNCRYL UD 3/0 PLS 30CM - [id number] Implant DEV CONTRL TISS STRATAFIX SPIRAL MNCRYL UD 3/0 PLS 30CM  [employer]  DIV OF J AND J [id number] Left 1 Implanted   DEV CONTRL TISS STRATAFIX SPIRAL PDS PLS CT1 2-0 45CM - [id number] Implant DEV CONTRL TISS STRATAFIX SPIRAL PDS PLS CT1 2-0 45CM  [employer]  DIV OF J AND J [id number] Left 1 Implanted   SUT FW #2 W/TPR NDL 1/2 CIR 38IN 97CM 26.5MM TERI - [id number] Implant SUT FW #2 W/TPR NDL 1/2 CIR 38IN 97CM 26.5MM TERI  ARTHREX [id number] Left 2 Implanted   HEMOST ABS SURGIFOAM  8X12 10MM - [id number] Implant HEMOST ABS SURGIFOAM  8X12 10MM  [employer]  DIV OF J AND J NR Left 1 Implanted   LINER ACET G7 VIVACIT/E NTRL HXPE SZG 36MM - [id number] Implant LINER ACET G7 VIVACIT/E NTRL HXPE SZG 36MM  [employer] [id number] Left 1 Implanted   SHLL ACET OSSEOTI G7 4H SZG 58MM - [id number] Implant SHLL ACET OSSEOTI G7 4H SZG 58MM  [employer] [id number] Left 1 Implanted   HD FEM/HIP BIOLOX/DELTA CERAM 12/95A76HL PLS0MM - [id number] Implant HD FEM/HIP  BIOLOX/DELTA CERAM 12/30H67OD PLS0MM  [employer] [id number] Left 1 Implanted   STEM FEM/HIP AVENIR/COMPLETE HI/OFFST HA SZ7 - [id number] Implant STEM FEM/HIP AVENIR/COMPLETE HI/OFFST HA SZ7  [employer] [id number] Left 1 Implanted       SURGEON: Leonela Sherwood MD     ASSISTANT: None    ANESTHESIA: General  Anesthesiologist: Jasen Dalton MD; Yovani Zavala MD; Dequan Ly MD  CRNA: Cindy Gillespie CRNA     Estimated Blood Loss: 200 mL    Specimens: * No orders in the log *    COMPLICATIONS: Nil.     DRAINS: * No LDAs found *    INDICATIONS: The patient is a 89 y.o. male who presented to   [facility] following a history of fall from standing height.  he was evaluated in the emergency room and was diagnosed with a hip fracture.  Secondary to multiple medical comorbidities.  The patient has been admitted to the hospital internist.  As I was on call for the emergency room I was notified regarding the injury and for further evaluation and management of the hip fracture.      The medical history was reviewed. The patient was indicated for a  total hip arthroplasty. Likely risks and benefits of the procedure including, but not limited to infection, DVT, pulmonary embolism, leg length discrepancy, recurrent dislocation, possibility of injury to nerves or vessels, and periprosthetic fractures, Possibility of medical complications including but not limited to stroke, heart attack have been discussed in detail. Despite the risks involved, the patient elected to proceed and informed consent was obtained. The patient was seen in the preoperative holding area, and the  operative site was marked.     DESCRIPTION OF PROCEDURE: The patient has been transferred to [facility] Operating Room.   Preoperative antibiotics in the form of  Kefzol was given intravenously prior to the incision.   After achieving adequate  anesthesia, the patient was transferred onto the [place] table. All bony prominences  were padded adequately.   The operative hip was prepped and draped in the usual sterile fashion.   Surgical timeout was done. Correct patient, surgical side and site were identified.      Tranexamic acid was given intravenously at the time of skin incision.    A skin incision was made overlying the anterolateral aspect of the  hip for about 10 cm from just below and lateral to the anterior superior iliac spine. Skin and subcutaneous tissue were incised and the deep fascia was incised in line with the skin incision overlying the tensor fascia jeromy muscle. The tensor muscle was retracted laterally and interval between the sartorius and the rectus femoris medially and the tensor fascia jeromy muscle laterally was developed. The lateral circumflex femoral vessels were identified. They were clamped, cut, and ligated. Unnamed deep fascia was incised. Capsule of the hip joint was identified. Retractors were placed extracapsularly.     The capsule was incised in a L-shaped manner and the anterior capsule was tagged with FiberWire.  Followed by this, the retractors were placed intracapsularly.     There was a hemarthrosis. The femoral neck fracture was identified.  Appropriate capsular releases were done along the inferior and  medial neck and along the saddle of the femoral neck. The femoral neck osteotomy was done distal to the fracture site utilizing an oscillating saw, femoral head was extracted without any difficulty. All bony debris was cleared.       The acetabular cavity was inspected and exposed appropriately by placing retractors taking care to protect the anterior neurovascular structures. . The labrum was excised, pulvinar was excised from the cotyloid fossa. Acetabular cavity was progressively reamed, maintaining the correct inclination and version under image intensifier control. Adequate medialization was obtained. Adequate fit was found to be obtained at reamer size 58 .  The G7 acetabular shell 58 mm was  seated into position. It was found to be seated satisfactorily with adequate inclination and anteversion. There was good stability. Followed by this, a polyethylene liner was seated into position, checked for stability. This was a 36 mm internal diameter,  highly cross linked neutral 0° lip liner.    The periarticular tissue was infiltrated with local anaesthetic injection which consisted of Naropin, clonidine and ketorolac mixture.     Attention was then directed to the proximal femur. The proximal femur was delivered by utilizing a trochanteric hook placed just distal to the vastus tubercle and proximal to the gluteus neto tendon. The leg was then externally rotated with the gross traction released and  hyperextension, adduction was done using the [place] table spar. The mechanical lift on the table was utilized to support the femur.  Appropriate capsular releases were made to expose the medial slope of the greater trochanter and the proximal femur was delivered. The femoral canal was entered by removing the lateral femoral neck with a box chisel followed by serial broaching. Adequate fit was found to be obtained with a size 7 broach. A trial reduction was performed. Leg lengths and offset were found to be satisfactory under image intensifier on comparison with the opposite hip under image intensifier. Reduction was found to be satisfactory and there was good stability with range of motion of the hip in internal and external rotation. Having been satisfied with the trials, the hip joint was dislocated.     The femoral  trial broach was removed and Avenir cementless high offset collar femoral stem size # 7 was seated into position. This was found to be satisfactorily seated with good stability.      The delta ceramic femoral head 36 mm + 0 mm neck length was seated onto the trunnion and impacted. Followed by this, the hip joint was reduced. Reduction was found to be satisfactory and image confirmed leg length,  offset , implant position and stem fill of the femoral canal.  There were no iatrogenic fractures. Hip joint was thoroughly irrigated with saline. Soft tissue hemostasis was secured. The sponge count and needle count was correct. The FiberWire sutures was tagged to the anterior capsular flaps and they were tied to each other. The incision was closed in layers with vicryl and monocryl sutures and the patient was transferred to the recovery room in a stable condition.     The patient tolerated the procedure well. There were no complications. The patient had adequate distal pulses and good capillary refill.     The patient will be mobilized with physical therapy.   Antibiotic prophylaxis  will be given postoperatively.     The  patient will be started on DVT prophylaxis with  Aspirin 81 mg twice daily.    I discussed the satisfactory performance of the procedure with the patient's family and discussed with them the postoperative management.    CPT CODE : 14387    Leonela Sherwood MD     Date: 5/22/2024  Time: 17:46 EDT    cc: Carmela Leija MD; MD Lidia Hartmann Patrick D, MD

## 2024-05-22 NOTE — ANESTHESIA POSTPROCEDURE EVALUATION
Patient: Nickolas Drake    Procedure Summary       Date: 05/22/24 Room / Location: [facility] OR  / [facility] MAIN OR    Anesthesia Start: 1528 Anesthesia Stop: 1815    Procedure: LEFT TOTAL HIP ARTHROPLASTY ANTERIOR WITH HANA TABLE (Left: Hip) Diagnosis:       Closed fracture of neck of left femur, initial encounter      (Closed fracture of neck of left femur, initial encounter [S72.002A])    Surgeons: Leonela Sherwood MD Provider: Yovani Zavala MD    Anesthesia Type: general ASA Status: 3            Anesthesia Type: general    Vitals  Vitals Value Taken Time   /64 05/22/24 1945   Temp 36.5 °C (97.7 °F) 05/22/24 1928   Pulse 56 05/22/24 1953   Resp 19 05/22/24 1815   SpO2 98 % 05/22/24 1953   Vitals shown include unfiled device data.        Post Anesthesia Care and Evaluation    Patient location during evaluation: bedside  Pain management: adequate    Airway patency: patent  Anesthetic complications: No anesthetic complications    Cardiovascular status: acceptable  Respiratory status: acceptable  Hydration status: acceptable

## 2024-05-22 NOTE — PROGRESS NOTES
Name: Nickolas Drake ADMIT: 2024   : 1934  PCP: Carmela Leija MD    MRN: [id number] LOS: 1 days   AGE/SEX: 89 y.o. male  ROOM: [facility]     Subjective   Subjective   Chief Complaint   Patient presents with    Fall    Hip Pain     No CP SOA NVD. Pain is severe at times. Reports tolerable with pain medications. Not reporting spasm.     Objective   Objective   Vital Signs  Temp:  [97.5 °F (36.4 °C)-98.3 °F (36.8 °C)] 97.5 °F (36.4 °C)  Heart Rate:  [66-82] 67  Resp:  [18] 18  BP: (117-150)/(64-77) 131/76  SpO2:  [94 %-97 %] 96 %  on   ;   Device (Oxygen Therapy): room air  Body mass index is 24.28 kg/m².    Physical Exam  Vitals and nursing note reviewed.   Constitutional:       General: He is not in acute distress.     Appearance: He is not diaphoretic.   HENT:      Head: Atraumatic.   Cardiovascular:      Rate and Rhythm: Normal rate and regular rhythm.   Pulmonary:      Effort: Pulmonary effort is normal.      Breath sounds: No wheezing.   Abdominal:      General: There is no distension.      Palpations: Abdomen is soft.      Tenderness: There is no abdominal tenderness. There is no guarding or rebound.   Musculoskeletal:         General: Tenderness present.   Skin:     General: Skin is warm and dry.   Neurological:      Mental Status: He is alert. Mental status is at baseline.   Psychiatric:         Mood and Affect: Mood normal.         Behavior: Behavior normal.       Results Review  I reviewed the patient's new clinical results.    Results from last 7 days   Lab Units 24  0437 24  1854   WBC 10*3/mm3 13.72* 15.15*   HEMOGLOBIN g/dL 14.6 13.5   PLATELETS 10*3/mm3 257 277     Results from last 7 days   Lab Units 24  0437 24  1854   SODIUM mmol/L 136 137   POTASSIUM mmol/L 4.0 4.6   CHLORIDE mmol/L 101 100   CO2 mmol/L 26.0 26.8   BUN mg/dL 18 19   CREATININE mg/dL 1.01 1.15   GLUCOSE mg/dL 137* 108*   EGFR mL/min/1.73 71.1 60.8     Results from last 7 days   Lab  "Units 05/21/24  1854   ALBUMIN g/dL 3.4*   BILIRUBIN mg/dL 0.2   ALK PHOS U/L 110   AST (SGOT) U/L 26   ALT (SGPT) U/L 12     Results from last 7 days   Lab Units 05/22/24  0437 05/21/24  1854   CALCIUM mg/dL 8.2* 8.6   ALBUMIN g/dL  --  3.4*         No results found for: \"HGBA1C\", \"POCGLU\"    XR Chest 1 View    Result Date: 5/21/2024  1. Small area of increased density in the right lung base may represent chronic parenchymal change versus mild atelectasis or pneumonia. 2. No other acute infiltrates are seen.   This report was finalized on 5/21/2024 7:24 PM by Dr. Chris Swartz M.D on Workstation: [id number]      XR Hip With or Without Pelvis 2 - 3 View Left    Result Date: 5/21/2024  1. Left femoral neck fracture.   This report was finalized on 5/21/2024 6:55 PM by Dr. Chris Swartz M.D on Workstation: [id number]       I have personally reviewed all medications:  Scheduled Medications  [START ON 5/23/2024] aspirin, 81 mg, Oral, BID  atorvastatin, 20 mg, Oral, Nightly  ipratropium-albuterol, 3 mL, Nebulization, BID - RT  nicotine, 1 patch, Transdermal, Q24H  senna-docusate sodium, 2 tablet, Oral, BID  sodium chloride, 10 mL, Intravenous, Q12H      Infusions  sodium chloride, 75 mL/hr, Last Rate: 75 mL/hr (05/21/24 2205)      Diet  NPO Diet NPO Type: Strict NPO    I have personally reviewed:  [x]  Laboratory   []  Microbiology   [x]  Radiology   [x]  EKG/Telemetry  []  Cardiology/Vascular   []  Pathology    [x]  Records       Assessment/Plan     Active Hospital Problems    Diagnosis  POA    **Closed left hip fracture [S72.002A]  Yes    DNR (do not resuscitate) [Z66]  Unknown    Closed fracture of neck of left femur [S72.002A]  Unknown    Tobacco use [Z72.0]  Yes    Coronary artery disease involving native coronary artery of native heart without angina pectoris [I25.10]  Yes    Essential hypertension [I10]  Yes      Resolved Hospital Problems   No resolved problems to display.       89 y.o. male admitted with " Closed left hip fracture.    Left Femur Fracture: Continue pain control. Orthopedic surgery consulted.  HTN: Acceptable acutely. Monitoring. No chronic medication noted  CAD: No active CP. ASA  Hyperglycemia: Screen A1c.  Leukocytosis: Afebrile. Improving.  PPX: SCD, per surgery postop  Disposition: TBD    Expected discharge date/ time has not been documented.     Abe Murillo MD  [facility]  05/22/24  10:42 EDT    Dictated portions of note using Dragon dictation software.  Copied text in this note has been reviewed by me and remains accurate as of 05/22/24

## 2024-05-22 NOTE — ANESTHESIA PREPROCEDURE EVALUATION
Anesthesia Evaluation     Patient summary reviewed and Nursing notes reviewed   NPO Solid Status: > 8 hours  NPO Liquid Status: > 4 hours           Airway   Mallampati: II  Neck ROM: full  No difficulty expected  Dental    (+) edentulous    Pulmonary     breath sounds clear to auscultation  (+) a smoker Current,  Cardiovascular     Rhythm: regular    (+) hypertension, past MI , CAD, cardiac stents , dysrhythmias Bradycardia, hyperlipidemia      Neuro/Psych  (+) syncope  GI/Hepatic/Renal/Endo      Musculoskeletal     Abdominal    Substance History      OB/GYN          Other                      Anesthesia Plan    ASA 3     general     intravenous induction     Anesthetic plan, risks, benefits, and alternatives have been provided, discussed and informed consent has been obtained with: patient.    CODE STATUS:    Medical Intervention Limits: NO cardioversion; NO intubation (DNI)  Code Status (Patient has no pulse and is not breathing): No CPR (Do Not Attempt to Resuscitate)  Medical Interventions (Patient has pulse or is breathing): Limited Support

## 2024-05-22 NOTE — PLAN OF CARE
Goal Outcome Evaluation:  Plan of Care Reviewed With: patient        Progress: no change  Outcome Evaluation: Pt remains stable. IV dilaudid given for pain. Urinal at bedside. NPO since MN. Ortho sx to see. IV fluids infusing. CHG bath completed. Educated on htn management.

## 2024-05-22 NOTE — PLAN OF CARE
Goal Outcome Evaluation:              Outcome Evaluation: L hip fx, A&O, VSS, RA prior to sx, taken to preop @ 1345 family was at bedside, plans for LTH, poor vision, educated on bp monitoring, has not returned from sx yet

## 2024-05-23 LAB
ANION GAP SERPL CALCULATED.3IONS-SCNC: 7.1 MMOL/L (ref 5–15)
BASOPHILS # BLD AUTO: 0.06 10*3/MM3 (ref 0–0.2)
BASOPHILS NFR BLD AUTO: 0.4 % (ref 0–1.5)
BUN SERPL-MCNC: 18 MG/DL (ref 8–23)
BUN/CREAT SERPL: 16.5 (ref 7–25)
CALCIUM SPEC-SCNC: 8.2 MG/DL (ref 8.6–10.5)
CHLORIDE SERPL-SCNC: 103 MMOL/L (ref 98–107)
CO2 SERPL-SCNC: 23.9 MMOL/L (ref 22–29)
CREAT SERPL-MCNC: 1.09 MG/DL (ref 0.76–1.27)
DEPRECATED RDW RBC AUTO: 41.2 FL (ref 37–54)
EGFRCR SERPLBLD CKD-EPI 2021: 64.9 ML/MIN/1.73
EOSINOPHIL # BLD AUTO: 0.01 10*3/MM3 (ref 0–0.4)
EOSINOPHIL NFR BLD AUTO: 0.1 % (ref 0.3–6.2)
ERYTHROCYTE [DISTWIDTH] IN BLOOD BY AUTOMATED COUNT: 11.8 % (ref 12.3–15.4)
GLUCOSE SERPL-MCNC: 128 MG/DL (ref 65–99)
HBA1C MFR BLD: 5.6 % (ref 4.8–5.6)
HCT VFR BLD AUTO: 32.3 % (ref 37.5–51)
HGB BLD-MCNC: 11.2 G/DL (ref 13–17.7)
IMM GRANULOCYTES # BLD AUTO: 0.1 10*3/MM3 (ref 0–0.05)
IMM GRANULOCYTES NFR BLD AUTO: 0.7 % (ref 0–0.5)
LYMPHOCYTES # BLD AUTO: 1.11 10*3/MM3 (ref 0.7–3.1)
LYMPHOCYTES NFR BLD AUTO: 7.6 % (ref 19.6–45.3)
MCH RBC QN AUTO: 33.5 PG (ref 26.6–33)
MCHC RBC AUTO-ENTMCNC: 34.7 G/DL (ref 31.5–35.7)
MCV RBC AUTO: 96.7 FL (ref 79–97)
MONOCYTES # BLD AUTO: 1.52 10*3/MM3 (ref 0.1–0.9)
MONOCYTES NFR BLD AUTO: 10.4 % (ref 5–12)
NEUTROPHILS NFR BLD AUTO: 11.85 10*3/MM3 (ref 1.7–7)
NEUTROPHILS NFR BLD AUTO: 80.8 % (ref 42.7–76)
NRBC BLD AUTO-RTO: 0 /100 WBC (ref 0–0.2)
PLATELET # BLD AUTO: 191 10*3/MM3 (ref 140–450)
PMV BLD AUTO: 9.2 FL (ref 6–12)
POTASSIUM SERPL-SCNC: 4.5 MMOL/L (ref 3.5–5.2)
RBC # BLD AUTO: 3.34 10*6/MM3 (ref 4.14–5.8)
SODIUM SERPL-SCNC: 134 MMOL/L (ref 136–145)
WBC NRBC COR # BLD AUTO: 14.65 10*3/MM3 (ref 3.4–10.8)

## 2024-05-23 PROCEDURE — 94664 DEMO&/EVAL PT USE INHALER: CPT

## 2024-05-23 PROCEDURE — 25810000003 SODIUM CHLORIDE 0.9 % SOLUTION: Performed by: ORTHOPAEDIC SURGERY

## 2024-05-23 PROCEDURE — 85025 COMPLETE CBC W/AUTO DIFF WBC: CPT | Performed by: ORTHOPAEDIC SURGERY

## 2024-05-23 PROCEDURE — 97161 PT EVAL LOW COMPLEX 20 MIN: CPT

## 2024-05-23 PROCEDURE — 94761 N-INVAS EAR/PLS OXIMETRY MLT: CPT

## 2024-05-23 PROCEDURE — 97530 THERAPEUTIC ACTIVITIES: CPT

## 2024-05-23 PROCEDURE — 83036 HEMOGLOBIN GLYCOSYLATED A1C: CPT | Performed by: ORTHOPAEDIC SURGERY

## 2024-05-23 PROCEDURE — 94799 UNLISTED PULMONARY SVC/PX: CPT

## 2024-05-23 PROCEDURE — 25010000002 CEFAZOLIN PER 500 MG: Performed by: ORTHOPAEDIC SURGERY

## 2024-05-23 PROCEDURE — 25810000003 SODIUM CHLORIDE 0.9 % SOLUTION: Performed by: INTERNAL MEDICINE

## 2024-05-23 PROCEDURE — 80048 BASIC METABOLIC PNL TOTAL CA: CPT | Performed by: ORTHOPAEDIC SURGERY

## 2024-05-23 RX ORDER — SODIUM CHLORIDE 9 MG/ML
75 INJECTION, SOLUTION INTRAVENOUS CONTINUOUS
Status: ACTIVE | OUTPATIENT
Start: 2024-05-23 | End: 2024-05-24

## 2024-05-23 RX ADMIN — ASPIRIN 81 MG: 81 TABLET, COATED ORAL at 20:31

## 2024-05-23 RX ADMIN — OXYCODONE AND ACETAMINOPHEN 1 TABLET: 7.5; 325 TABLET ORAL at 20:31

## 2024-05-23 RX ADMIN — SODIUM CHLORIDE 100 ML/HR: 9 INJECTION, SOLUTION INTRAVENOUS at 05:22

## 2024-05-23 RX ADMIN — IPRATROPIUM BROMIDE AND ALBUTEROL SULFATE 3 ML: 2.5; .5 SOLUTION RESPIRATORY (INHALATION) at 20:28

## 2024-05-23 RX ADMIN — ACETAMINOPHEN 650 MG: 325 TABLET, FILM COATED ORAL at 08:03

## 2024-05-23 RX ADMIN — ASPIRIN 81 MG: 81 TABLET, COATED ORAL at 08:03

## 2024-05-23 RX ADMIN — SODIUM CHLORIDE 2000 MG: 900 INJECTION INTRAVENOUS at 05:23

## 2024-05-23 RX ADMIN — Medication 10 ML: at 20:32

## 2024-05-23 RX ADMIN — IPRATROPIUM BROMIDE AND ALBUTEROL SULFATE 3 ML: 2.5; .5 SOLUTION RESPIRATORY (INHALATION) at 07:55

## 2024-05-23 RX ADMIN — SODIUM CHLORIDE 75 ML/HR: 9 INJECTION, SOLUTION INTRAVENOUS at 12:41

## 2024-05-23 NOTE — PROGRESS NOTES
Continued Stay Note  [facility]     Patient Name: Nickolas Drake  MRN: [id number]  Today's Date: 5/23/2024    Admit Date: 5/21/2024    Plan: SNF TBD   Discharge Plan       Row Name 05/23/24 1502       Plan    Plan SNF TBD    Plan Comments Spoke with pts daughter who states they would like referrals placed in this order 1. [facility]. [facility] 4. [place] [facility] [place]. Will notify [facility] to check bed availability. Pharmacy updated and partial packet will be in CCP office.      Row Name 05/23/24 1233       Plan    Plan SNF TBD    Patient/Family in Agreement with Plan yes    Plan Comments Spoke with pt, verified correct information on facesheet and explained the role of CCP. Pt states he lives alone in a single story home with no steps to enter. Pt states prior to this admission pt was using a walker on occasion but patient does not drive due to poor vision. Pt states one of his step daughters takes him to appointments and to the store on Thursdays. His POA Judie Salazar lives in [place] but she flew in for patient procedure so is here currently, spoke with her via telephone with patients permission  [phone]. Pt states his pharmacy is [facility] and confirmed PCP ANGIE. Pt states he will let Judie decide regarding d/c planning but stated with him living alone the MD would likely recommend SNF--short term. Per Judie, she requested a SNF list be emailed to her at [email or web address]. Email sent and awaiting call back with facility choices. Per Judie, patient will need SNF at d/c. Awaiting determination of choices to send in Epic, daughter Judie to call with those choices asap. CCP to follow.                   Discharge Codes    No documentation.                       Jessica Ware RN

## 2024-05-23 NOTE — PROGRESS NOTES
Name: Nickolas Drake ADMIT: 2024   : 1934  PCP: Carmela Leija MD    MRN: [id number] LOS: 2 days   AGE/SEX: 89 y.o. male  ROOM: [facility]     Subjective   Subjective   Chief Complaint   Patient presents with    Fall    Hip Pain     No CP SOA NVD. Pain present but improved compared to preop.     Objective   Objective   Vital Signs  Temp:  [97 °F (36.1 °C)-98 °F (36.7 °C)] 97.7 °F (36.5 °C)  Heart Rate:  [57-81] 76  Resp:  [16-20] 16  BP: ()/(52-88) 97/61  SpO2:  [92 %-100 %] 95 %  on  Flow (L/min):  [2-3] 3;   Device (Oxygen Therapy): nasal cannula  Body mass index is 24.28 kg/m².    Physical Exam  Vitals and nursing note reviewed.   Constitutional:       General: He is not in acute distress.     Appearance: He is not diaphoretic.   HENT:      Head: Atraumatic.   Cardiovascular:      Rate and Rhythm: Normal rate and regular rhythm.   Pulmonary:      Effort: Pulmonary effort is normal.      Breath sounds: No wheezing.   Abdominal:      General: There is no distension.      Palpations: Abdomen is soft.      Tenderness: There is no abdominal tenderness. There is no guarding or rebound.   Musculoskeletal:         General: Tenderness present.   Skin:     General: Skin is warm and dry.   Neurological:      Mental Status: He is alert. Mental status is at baseline.   Psychiatric:         Mood and Affect: Mood normal.         Behavior: Behavior normal.       Results Review  I reviewed the patient's new clinical results.    Results from last 7 days   Lab Units 24  0502 24  0437 24  1854   WBC 10*3/mm3 14.65* 13.72* 15.15*   HEMOGLOBIN g/dL 11.2* 14.6 13.5   PLATELETS 10*3/mm3 191 257 277     Results from last 7 days   Lab Units 24  0502 24  0437 24  1854   SODIUM mmol/L 134* 136 137   POTASSIUM mmol/L 4.5 4.0 4.6   CHLORIDE mmol/L 103 101 100   CO2 mmol/L 23.9 26.0 26.8   BUN mg/dL 18 18 19   CREATININE mg/dL 1.09 1.01 1.15   GLUCOSE mg/dL 128* 137* 108*    EGFR mL/min/1.73 64.9 71.1 60.8     Results from last 7 days   Lab Units 05/21/24  1854   ALBUMIN g/dL 3.4*   BILIRUBIN mg/dL 0.2   ALK PHOS U/L 110   AST (SGOT) U/L 26   ALT (SGPT) U/L 12     Results from last 7 days   Lab Units 05/23/24  0502 05/22/24  0437 05/21/24  1854   CALCIUM mg/dL 8.2* 8.2* 8.6   ALBUMIN g/dL  --   --  3.4*         Hemoglobin A1C   Date/Time Value Ref Range Status   05/23/2024 0502 5.60 4.80 - 5.60 % Final       XR Hip With or Without Pelvis 1 View Left    Result Date: 5/22/2024  Postoperative changes from recent left total arthroplasty without findings of immediate complication.    This report was finalized on 5/22/2024 8:23 PM by Dr. Ck Mccartney M.D on Workstation: [id number]      XR Chest 1 View    Result Date: 5/21/2024  1. Small area of increased density in the right lung base may represent chronic parenchymal change versus mild atelectasis or pneumonia. 2. No other acute infiltrates are seen.   This report was finalized on 5/21/2024 7:24 PM by Dr. Chris Swartz M.D on Workstation: [id number]      XR Hip With or Without Pelvis 2 - 3 View Left    Result Date: 5/21/2024  1. Left femoral neck fracture.   This report was finalized on 5/21/2024 6:55 PM by Dr. Chris Swartz M.D on Workstation: [id number]       I have personally reviewed all medications:  Scheduled Medications  aspirin, 81 mg, Oral, BID  atorvastatin, 20 mg, Oral, Nightly  docusate sodium, 100 mg, Oral, BID  ipratropium-albuterol, 3 mL, Nebulization, BID - RT  nicotine, 1 patch, Transdermal, Q24H  senna-docusate sodium, 2 tablet, Oral, BID  sodium chloride, 10 mL, Intravenous, Q12H      Infusions  sodium chloride, 75 mL/hr, Last Rate: 75 mL/hr (05/22/24 1226)  sodium chloride, 100 mL/hr, Last Rate: Stopped (05/23/24 0641)      Diet  Diet: Regular/House; Fluid Consistency: Thin (IDDSI 0)    I have personally reviewed:  [x]  Laboratory   []  Microbiology   [x]  Radiology   []  EKG/Telemetry  []  Cardiology/Vascular    []  Pathology    []  Records       Assessment/Plan     Active Hospital Problems    Diagnosis  POA    **Closed left hip fracture [S72.002A]  Yes    Status post total hip replacement, left [Z96.642]  Not Applicable    DNR (do not resuscitate) [Z66]  Unknown    Tobacco use [Z72.0]  Yes    Coronary artery disease involving native coronary artery of native heart without angina pectoris [I25.10]  Yes    Essential hypertension [I10]  Yes      Resolved Hospital Problems    Diagnosis Date Resolved POA    Closed fracture of neck of left femur [S72.002A] 05/22/2024 Yes       89 y.o. male admitted with Closed left hip fracture.    Left Femur Fracture: sp left total hip arthoplasty 05/22. Continue pain control. Orthopedic surgery following.  HTN: Labile yesterday. Improved this morning. Will monitor.  CAD: No active CP. ASA  Hyperglycemia: A1c ok at 5.6.  Leukocytosis: Afebrile. No acute respiratory symptoms. Monitoring  PPX: ASA per surgery  Disposition: SNF/possibly tomorrow    Expected discharge date/ time has not been documented.     Abe Murillo MD  [facility]  05/23/24  10:56 EDT    Dictated portions of note using Dragon dictation software.  Copied text in this note has been reviewed by me and remains accurate as of 05/23/24

## 2024-05-23 NOTE — THERAPY EVALUATION
Patient Name: Nickolas Drake  : 1934    MRN: [id number]                              Today's Date: 2024       Admit Date: 2024    Visit Dx:     ICD-10-CM ICD-9-CM   1. Closed fracture of neck of left femur, initial encounter  S72.002A 820.8   2. Skin tear of left elbow without complication, initial encounter  S51.012A 881.01     Patient Active Problem List   Diagnosis    Dyslipidemia    CAD in native artery    Essential hypertension    Coronary artery disease involving native coronary artery of native heart without angina pectoris    Hyperlipemia    S/P drug eluting coronary stent placement    Tobacco use    Falls    Lightheadedness    Bradycardia, sinus    Syncope and collapse    Closed left hip fracture    DNR (do not resuscitate)    Status post total hip replacement, left     Past Medical History:   Diagnosis Date    Atherosclerotic heart disease     s/p anterior ST elevation MI 2013: mid LAD Xience xpedition 2.5 x 23, 2.5 x 12 mm stent. Proximal LAD 20% stenosis. Mid LCx 20-30% stenosis. Mid RCA 20% stenosis.    CAD (coronary artery disease)     Dyslipidemia     Health care maintenance     Hyperlipidemia     Hypertension     Ischemic cardiomyopathy     Near syncope     ST elevation (STEMI) myocardial infarction      Past Surgical History:   Procedure Laterality Date    CARDIAC CATHETERIZATION Left 2013    [facility], selective coronary angiogram, PCI stent of mid LAD, left ventricular angiogram, Dr. Janet Snowden    TOTAL HIP ARTHROPLASTY Left 2024    Procedure: LEFT TOTAL HIP ARTHROPLASTY ANTERIOR WITH HANA TABLE;  Surgeon: Leonela Sherwood MD;  Location: [facility];  Service: Orthopedics;  Laterality: Left;      General Information       Row Name 24 0854          Physical Therapy Time and Intention    Document Type evaluation  -CS     Mode of Treatment individual therapy;physical therapy  -CS       Row Name 24 0854          General  Information    Patient Profile Reviewed yes  -CS     Prior Level of Function independent:;gait;transfer;bed mobility  RW  -CS     Existing Precautions/Restrictions fall;left;hip, anterior  -CS     Barriers to Rehab none identified  -CS       Row Name 05/23/24 0854          Living Environment    People in Home alone  -CS       Row Name 05/23/24 0854          Home Main Entrance    Number of Stairs, Main Entrance none  -CS       Row Name 05/23/24 0854          Stairs Within Home, Primary    Number of Stairs, Within Home, Primary none  -CS       Row Name 05/23/24 0854          Cognition    Orientation Status (Cognition) oriented x 3  -CS       Row Name 05/23/24 0854          Safety Issues, Functional Mobility    Impairments Affecting Function (Mobility) balance;endurance/activity tolerance;pain;strength  -CS               User Key  (r) = Recorded By, (t) = Taken By, (c) = Cosigned By      Initials Name Provider Type    CS Leticia Ware PT Physical Therapist                   Mobility       Row Name 05/23/24 0854          Bed Mobility    Bed Mobility supine-sit  -CS     Supine-Sit Keith (Bed Mobility) moderate assist (50% patient effort);verbal cues  -CS     Assistive Device (Bed Mobility) bed rails;head of bed elevated  -CS     Comment, (Bed Mobility) cues for sequencing + increased time to complete; UIC at end of session  -CS       Row Name 05/23/24 0854          Sit-Stand Transfer    Sit-Stand Keith (Transfers) moderate assist (50% patient effort);verbal cues  -CS     Assistive Device (Sit-Stand Transfers) walker, front-wheeled  -CS     Comment, (Sit-Stand Transfer) VC for UE placement  -CS       Row Name 05/23/24 0854          Gait/Stairs (Locomotion)    Keith Level (Gait) minimum assist (75% patient effort);verbal cues  -CS     Assistive Device (Gait) walker, front-wheeled  -CS     Distance in Feet (Gait) 20  -CS     Deviations/Abnormal Patterns (Gait) antalgic;adrian decreased;gait speed  decreased;stride length decreased  -CS     Bilateral Gait Deviations forward flexed posture;heel strike decreased  -CS     Left Sided Gait Deviations weight shift ability decreased  -CS     [place] Level (Stairs) not tested  -CS     Comment, (Gait/Stairs) very slow antalgic gait; multiple rest breaks due to fatigue and pain; mildly unsteady  -       Row Name 05/23/24 0854          Mobility    Extremity Weight-bearing Status left lower extremity  -CS     Left Lower Extremity (Weight-bearing Status) weight-bearing as tolerated (WBAT)  -CS               User Key  (r) = Recorded By, (t) = Taken By, (c) = Cosigned By      Initials Name Provider Type    CS Leticia Ware, PT Physical Therapist                   Obj/Interventions       Row Name 05/23/24 0855          Range of Motion Comprehensive    General Range of Motion other (see comments)  -CS     Comment, General Range of Motion L LE limited secondary to post-op; R LE WFL  -       Row Name 05/23/24 0855          Strength Comprehensive (MMT)    General Manual Muscle Testing (MMT) Assessment other (see comments)  -CS     Comment, General Manual Muscle Testing (MMT) Assessment L LE limited secondary to post-op; R LE WFL  -CS       Row Name 05/23/24 0855          Motor Skills    Therapeutic Exercise other (see comments)  10 reps L SANDRINE protocol  -       Row Name 05/23/24 0855          Balance    Balance Assessment standing static balance;standing dynamic balance  -CS     Static Standing Balance contact guard  -     Dynamic Standing Balance minimal assist  -CS     Position/Device Used, Standing Balance supported;walker, front-wheeled  -     Comment, Balance mildly unsteady  -               User Key  (r) = Recorded By, (t) = Taken By, (c) = Cosigned By      Initials Name Provider Type    CS Leticia Ware, PT Physical Therapist                   Goals/Plan       Row Name 05/23/24 0900          Bed Mobility Goal 1 (PT)    Activity/Assistive Device (Bed  Mobility Goal 1, PT) bed mobility activities, all  -CS     Harmon Level/Cues Needed (Bed Mobility Goal 1, PT) modified independence  -CS     Time Frame (Bed Mobility Goal 1, PT) 1 week  -CS       Row Name 05/23/24 0900          Transfer Goal 1 (PT)    Activity/Assistive Device (Transfer Goal 1, PT) sit-to-stand/stand-to-sit;bed-to-chair/chair-to-bed  -CS     Harmon Level/Cues Needed (Transfer Goal 1, PT) modified independence  -CS     Time Frame (Transfer Goal 1, PT) 1 week  -CS       Row Name 05/23/24 0900          Gait Training Goal 1 (PT)    Activity/Assistive Device (Gait Training Goal 1, PT) gait (walking locomotion);assistive device use  -CS     Harmon Level (Gait Training Goal 1, PT) modified independence  -CS     Distance (Gait Training Goal 1, PT) 40'  -CS     Time Frame (Gait Training Goal 1, PT) 1 week  -CS               User Key  (r) = Recorded By, (t) = Taken By, (c) = Cosigned By      Initials Name Provider Type    CS Leticia Ware, PT Physical Therapist                   Clinical Impression       Row Name 05/23/24 0856          Pain    Pretreatment Pain Rating 6/10  -CS     Posttreatment Pain Rating 7/10  -CS     Pain Location - Side/Orientation Left  -CS     Pain Location incisional  -CS     Pain Location - hip  -CS     Pain Intervention(s) Ambulation/increased activity;Rest;Repositioned  -CS       Row Name 05/23/24 0899          Plan of Care Review    Plan of Care Reviewed With patient  -CS     Outcome Evaluation Pt is a 89 y/o M admitted to [facility] after a fall at home resulting in a closed fracture of neck of left femur. Pt is now POD 1 s/p L anterior SANDRINE. Pt reports he lives alone with 0 TONY and uses a RW for mobility at baseline. Pt presents to PT with expected post-op pain, weakness, decreased endurance, and impaired functional mobility. Pt completed supine to sit and STS txf requiring mod A. Pt with slight posterior LOB once standing requiring greater assist to maintain  standing. Pt ambulated 20' c RW requiring min A. Pt demo's a very slow antalgic mildly unsteady gait requiring multiple rest breaks due to fatigue and pain. Pt returned to sitting [facility] and completed SANDRINE protocol. PT recommends SNF at D/C to address functional deficits as pt is unsafe to return home and complete mobility and ADL's independently.  -CS       Row Name 05/23/24 0856          Therapy Assessment/Plan (PT)    Rehab Potential (PT) good, to achieve stated therapy goals  -CS     Criteria for Skilled Interventions Met (PT) yes;meets criteria  -CS     Therapy Frequency (PT) daily  -CS       Row Name 05/23/24 0856          Vital Signs    O2 Delivery Pre Treatment room air  -CS     O2 Delivery Intra Treatment room air  -CS     O2 Delivery Post Treatment room air  -CS     Pre Patient Position Supine  -CS     Post Patient Position Sitting  -CS       Row Name 05/23/24 0856          Positioning and Restraints    Pre-Treatment Position in bed  -CS     Post Treatment Position chair  -CS     In Chair reclined;call light within reach;encouraged to call for assist;exit alarm on;heels elevated  -CS               User Key  (r) = Recorded By, (t) = Taken By, (c) = Cosigned By      Initials Name Provider Type    CS Leticia Ware, PT Physical Therapist                   Outcome Measures       Row Name 05/23/24 0900          How much help from another person do you currently need...    Turning from your back to your side while in flat bed without using bedrails? 3  -CS     Moving from lying on back to sitting on the side of a flat bed without bedrails? 2  -CS     Moving to and from a bed to a chair (including a wheelchair)? 3  -CS     Standing up from a chair using your arms (e.g., wheelchair, bedside chair)? 2  -CS     Climbing 3-5 steps with a railing? 2  -CS     To walk in hospital room? 3  -CS     AM-PAC 6 Clicks Score (PT) 15  -CS     Highest Level of Mobility Goal 4 --> Transfer to chair/commode  -CS       Row Name  05/23/24 0900          Functional Assessment    Outcome Measure Options AM-PAC 6 Clicks Basic Mobility (PT)  -CS               User Key  (r) = Recorded By, (t) = Taken By, (c) = Cosigned By      Initials Name Provider Type    CS Leticia Ware PT Physical Therapist                                 Physical Therapy Education       Title: PT OT SLP Therapies (Done)       Topic: Physical Therapy (Done)       Point: Mobility training (Done)       Learning Progress Summary             Patient Acceptance, E,TB, VU,DU,NR by  at 5/23/2024 0901                         Point: Home exercise program (Done)       Learning Progress Summary             Patient Acceptance, E,TB, VU,DU,NR by  at 5/23/2024 0901                         Point: Body mechanics (Done)       Learning Progress Summary             Patient Acceptance, E,TB, VU,DU,NR by  at 5/23/2024 0901                         Point: Precautions (Done)       Learning Progress Summary             Patient Acceptance, E,TB, VU,DU,NR by  at 5/23/2024 0901                                         User Key       Initials Effective Dates Name Provider Type Discipline     09/22/22 -  Leticia Ware PT Physical Therapist PT                  PT Recommendation and Plan     Plan of Care Reviewed With: patient  Outcome Evaluation: Pt is a 89 y/o M admitted to [facility] after a fall at home resulting in a closed fracture of neck of left femur. Pt is now POD 1 s/p L anterior SANDRINE. Pt reports he lives alone with 0 TONY and uses a RW for mobility at baseline. Pt presents to PT with expected post-op pain, weakness, decreased endurance, and impaired functional mobility. Pt completed supine to sit and STS txf requiring mod A. Pt with slight posterior LOB once standing requiring greater assist to maintain standing. Pt ambulated 20' c RW requiring min A. Pt demo's a very slow antalgic mildly unsteady gait requiring multiple rest breaks due to fatigue and pain. Pt returned to sitting [facility]  and completed SANDRINE protocol. PT recommends SNF at D/C to address functional deficits as pt is unsafe to return home and complete mobility and ADL's independently.     Time Calculation:         PT Charges       Row Name 05/23/24 0901             Time Calculation    Start Time 0833  -CS      Stop Time 0852  -CS      Time Calculation (min) 19 min  -CS      PT Received On 05/23/24  -CS      PT - Next Appointment 05/24/24  -CS      PT Goal Re-Cert Due Date 05/30/24  -CS         Time Calculation- PT    Total Timed Code Minutes- PT 15 minute(s)  -CS         Timed Charges    55419 - PT Therapeutic Activity Minutes 15  -CS         Total Minutes    Timed Charges Total Minutes 15  -CS       Total Minutes 15  -CS                User Key  (r) = Recorded By, (t) = Taken By, (c) = Cosigned By      Initials Name Provider Type    CS Leticia Ware, PT Physical Therapist                  Therapy Charges for Today       Code Description Service Date Service Provider Modifiers Qty    03720846253 HC PT THERAPEUTIC ACT EA 15 MIN 5/23/2024 Leticia Ware, PT GP 1    77341627563 HC PT EVAL LOW COMPLEXITY 3 5/23/2024 Leticia Ware, PT GP 1            PT G-Codes  Outcome Measure Options: AM-PAC 6 Clicks Basic Mobility (PT)  AM-PAC 6 Clicks Score (PT): 15  PT Discharge Summary  Anticipated Discharge Disposition (PT): skilled nursing facility    Leticia Ware PT  5/23/2024

## 2024-05-23 NOTE — PLAN OF CARE
Goal Outcome Evaluation:  Plan of Care Reviewed With: patient        Progress: improving  Outcome Evaluation: Pt remains stable. Some confusing during the night but able to reorient, did get up to edge of bed without assistance. Attempts to void per urinal have been unsuccessful, remains dtv. No c/o pain. PRN neb given by RT for expiratory wheezing and this did improve some, remains on 3L and IS use encouraged. IV fluids infusing. Monitoring BP with hx of htn. CCP to see for dc planning.

## 2024-05-23 NOTE — DISCHARGE PLACEMENT REQUEST
"Sumit Drake (89 y.o. Male)       Date of Birth   1934    Social Security Number       Address   [address] [address]    Home Phone   [phone]    MRN   [id number]       Christianity   Unknown    Marital Status                               Admission Date   5/21/24    Admission Type   Emergency    Admitting Provider   Oli Pereira MD    Attending Provider   Abe Murillo MD    Department, Room/Bed   [address], P780/1       Discharge Date       Discharge Disposition       Discharge Destination                                 Attending Provider: Abe Murillo MD    Allergies: No Known Allergies    Isolation: None   Infection: None   Code Status: CPR    Ht: 180.3 cm (71\")   Wt: 79 kg (174 lb 1.6 oz)    Admission Cmt: None   Principal Problem: Closed left hip fracture [S72.002A]                   Active Insurance as of 5/21/2024       Primary Coverage       Payor Plan Insurance Group Employer/Plan Group    MEDICARE MEDICARE A & B        Payor Plan Address Payor Plan Phone Number Payor Plan Fax Number Effective Dates    [address] [phone]  7/1/1999 - None Entered    [place] [address]         Subscriber Name Subscriber Birth Date Member ID       SUMIT DRAKE 1934 [id number]                     Emergency Contacts        (Rel.) Home Phone Work Phone Mobile Phone    Judie Salazar(POA) (Daughter) -- -- [phone]    Svetlana Sierra(step daughter) (Other) [phone] -- --    TONY HARVEY (Relative) -- -- [phone]    Carly Harvey (Daughter) -- -- [phone]            "

## 2024-05-23 NOTE — PLAN OF CARE
Goal Outcome Evaluation:  Plan of Care Reviewed With: patient           Outcome Evaluation: Pt is a 88 y/o M admitted to [facility] after a fall at home resulting in a closed fracture of neck of left femur. Pt is now POD 1 s/p L anterior SANDRINE. Pt reports he lives alone with 0 TONY and uses a RW for mobility at baseline. Pt presents to PT with expected post-op pain, weakness, decreased endurance, and impaired functional mobility. Pt completed supine to sit and STS txf requiring mod A. Pt with slight posterior LOB once standing requiring greater assist to maintain standing. Pt ambulated 20' c RW requiring min A. Pt demo's a very slow antalgic mildly unsteady gait requiring multiple rest breaks due to fatigue and pain. Pt returned to sitting C and completed SANDRINE protocol. PT recommends SNF at D/C to address functional deficits as pt is unsafe to return home and complete mobility and ADL's independently.      Anticipated Discharge Disposition (PT): skilled nursing facility

## 2024-05-23 NOTE — PROGRESS NOTES
Continued Stay Note  [facility]     Patient Name: Nickolas Drake  MRN: [id number]  Today's Date: 5/23/2024    Admit Date: 5/21/2024    Plan: [facility], bed available Saturday 5/25   Discharge Plan       Row Name 05/23/24 1542       Plan    Plan [facility], bed available Saturday 5/25    Patient/Family in Agreement with Plan yes    Plan Comments Per Jessica/[facility], pt is approved with bed available Saturday 5/25 at [facility]. Pharmacy updated, packet will be on chart. Pt may need WC van or transport to SNF with family. CCP to follow/arrange transport if needed.      Row Name 05/23/24 1502       Plan    Plan SNF TBD    Plan Comments Spoke with pts daughter who states they would like referrals placed in this order 1. [facility] 2. [facility] 3. [facility] 4. [facility]. Will notify Jessica/[facility] to check bed availability. Pharmacy updated and partial packet will be in CCP office.                   Discharge Codes    No documentation.                       Jessica Ware RN

## 2024-05-23 NOTE — PLAN OF CARE
Goal Outcome Evaluation:           Progress: improving  Outcome Evaluation: Patient with minimal c/o pain when resting. New IV placed today, NS infusing @75/hr. Voiding with assist of 1. Anticipate discharge to SNF on Saturday.

## 2024-05-23 NOTE — PROGRESS NOTES
Continued Stay Note  [facility]     Patient Name: Nickolas Drake  MRN: [id number]  Today's Date: 5/23/2024    Admit Date: 5/21/2024    Plan: SNF TBD   Discharge Plan       Row Name 05/23/24 1233       Plan    Plan SNF TBD    Patient/Family in Agreement with Plan yes    Plan Comments Spoke with pt, verified correct information on facesheet and explained the role of CCP. Pt states he lives alone in a single story home with no steps to enter. Pt states prior to this admission pt was using a walker on occasion but patient does not drive due to poor vision. Pt states one of his step daughters takes him to appointments and to the store on Thursdays. His POA Judie Salazar lives in [place] but she flew in for patient procedure so is here currently, spoke with her via telephone with patients permission  [phone]. Pt states his pharmacy is [facility] and confirmed PCP ANGIE. Pt states he will let Judie decide regarding d/c planning but stated with him living alone the MD would likely recommend SNF--short term. Per Judie, she requested a SNF list be emailed to her at [email or web address]. Email sent and awaiting call back with facility choices. Per Judie, patient will need SNF at d/c. Awaiting determination of choices to send in Epic, daughter Judie to call with those choices asap. CCP to follow.                   Discharge Codes    No documentation.                       Jessica Ware RN

## 2024-05-24 LAB
ANION GAP SERPL CALCULATED.3IONS-SCNC: 7.2 MMOL/L (ref 5–15)
BASOPHILS # BLD AUTO: 0.1 10*3/MM3 (ref 0–0.2)
BASOPHILS NFR BLD AUTO: 0.8 % (ref 0–1.5)
BUN SERPL-MCNC: 17 MG/DL (ref 8–23)
BUN/CREAT SERPL: 19.5 (ref 7–25)
CALCIUM SPEC-SCNC: 7.7 MG/DL (ref 8.6–10.5)
CHLORIDE SERPL-SCNC: 105 MMOL/L (ref 98–107)
CO2 SERPL-SCNC: 24.8 MMOL/L (ref 22–29)
CREAT SERPL-MCNC: 0.87 MG/DL (ref 0.76–1.27)
DEPRECATED RDW RBC AUTO: 41.9 FL (ref 37–54)
EGFRCR SERPLBLD CKD-EPI 2021: 82.5 ML/MIN/1.73
EOSINOPHIL # BLD AUTO: 0.64 10*3/MM3 (ref 0–0.4)
EOSINOPHIL NFR BLD AUTO: 5.1 % (ref 0.3–6.2)
ERYTHROCYTE [DISTWIDTH] IN BLOOD BY AUTOMATED COUNT: 11.9 % (ref 12.3–15.4)
GLUCOSE SERPL-MCNC: 103 MG/DL (ref 65–99)
HCT VFR BLD AUTO: 30.6 % (ref 37.5–51)
HGB BLD-MCNC: 10.6 G/DL (ref 13–17.7)
IMM GRANULOCYTES # BLD AUTO: 0.08 10*3/MM3 (ref 0–0.05)
IMM GRANULOCYTES NFR BLD AUTO: 0.6 % (ref 0–0.5)
LYMPHOCYTES # BLD AUTO: 1.73 10*3/MM3 (ref 0.7–3.1)
LYMPHOCYTES NFR BLD AUTO: 13.8 % (ref 19.6–45.3)
MCH RBC QN AUTO: 33.4 PG (ref 26.6–33)
MCHC RBC AUTO-ENTMCNC: 34.6 G/DL (ref 31.5–35.7)
MCV RBC AUTO: 96.5 FL (ref 79–97)
MONOCYTES # BLD AUTO: 1.61 10*3/MM3 (ref 0.1–0.9)
MONOCYTES NFR BLD AUTO: 12.9 % (ref 5–12)
NEUTROPHILS NFR BLD AUTO: 66.8 % (ref 42.7–76)
NEUTROPHILS NFR BLD AUTO: 8.34 10*3/MM3 (ref 1.7–7)
NRBC BLD AUTO-RTO: 0 /100 WBC (ref 0–0.2)
PLATELET # BLD AUTO: 191 10*3/MM3 (ref 140–450)
PMV BLD AUTO: 9.5 FL (ref 6–12)
POTASSIUM SERPL-SCNC: 4.3 MMOL/L (ref 3.5–5.2)
RBC # BLD AUTO: 3.17 10*6/MM3 (ref 4.14–5.8)
SODIUM SERPL-SCNC: 137 MMOL/L (ref 136–145)
WBC NRBC COR # BLD AUTO: 12.5 10*3/MM3 (ref 3.4–10.8)

## 2024-05-24 PROCEDURE — 94664 DEMO&/EVAL PT USE INHALER: CPT

## 2024-05-24 PROCEDURE — 94799 UNLISTED PULMONARY SVC/PX: CPT

## 2024-05-24 PROCEDURE — 25810000003 SODIUM CHLORIDE 0.9 % SOLUTION: Performed by: INTERNAL MEDICINE

## 2024-05-24 PROCEDURE — 97530 THERAPEUTIC ACTIVITIES: CPT

## 2024-05-24 PROCEDURE — 80048 BASIC METABOLIC PNL TOTAL CA: CPT | Performed by: ORTHOPAEDIC SURGERY

## 2024-05-24 PROCEDURE — 85025 COMPLETE CBC W/AUTO DIFF WBC: CPT | Performed by: ORTHOPAEDIC SURGERY

## 2024-05-24 PROCEDURE — 94760 N-INVAS EAR/PLS OXIMETRY 1: CPT

## 2024-05-24 RX ADMIN — ASPIRIN 81 MG: 81 TABLET, COATED ORAL at 20:06

## 2024-05-24 RX ADMIN — IPRATROPIUM BROMIDE AND ALBUTEROL SULFATE 3 ML: 2.5; .5 SOLUTION RESPIRATORY (INHALATION) at 19:30

## 2024-05-24 RX ADMIN — Medication 10 ML: at 08:56

## 2024-05-24 RX ADMIN — Medication 10 ML: at 20:06

## 2024-05-24 RX ADMIN — ASPIRIN 81 MG: 81 TABLET, COATED ORAL at 08:55

## 2024-05-24 RX ADMIN — IPRATROPIUM BROMIDE AND ALBUTEROL SULFATE 3 ML: 2.5; .5 SOLUTION RESPIRATORY (INHALATION) at 07:59

## 2024-05-24 RX ADMIN — SODIUM CHLORIDE 75 ML/HR: 9 INJECTION, SOLUTION INTRAVENOUS at 00:28

## 2024-05-24 NOTE — PLAN OF CARE
Goal Outcome Evaluation:  Plan of Care Reviewed With: patient        Progress: no change  Outcome Evaluation: Pt received in bed and agreeable to PT. Pt completed SANDRINE protocol prior to mobility. Pt performed supine to sit and STS txf requiring mod A and increased time to complete. Pt ambulated 20' c RW requiring min. Pt continues to demo a slow antalgic gait but able to progress to a step-through pattern. Pt fatigues quickly with minimal activity which limited distance. Pt UIC with all needs in reach. Pt hopefully plans to D/C to SNF tomorrow.      Anticipated Discharge Disposition (PT): skilled nursing facility

## 2024-05-24 NOTE — PROGRESS NOTES
Name: Nickolas Drake ADMIT: 2024   : 1934  PCP: Carmela Leija MD    MRN: [id number] LOS: 3 days   AGE/SEX: 89 y.o. male  ROOM: [facility]     Subjective   Subjective   Patient seen and examined this morning.  Hospital day 3.  He is awake and resting in the chair, states that he feels okay today, pain is controlled, he is having BM.  No additional acute complaints at present.       Objective   Objective   Vital Signs  Temp:  [97.7 °F (36.5 °C)-98.7 °F (37.1 °C)] 98.7 °F (37.1 °C)  Heart Rate:  [74-91] 80  Resp:  [16-20] 20  BP: ()/(61-69) 118/69  SpO2:  [90 %-95 %] 90 %  on  Flow (L/min):  [3] 3;   Device (Oxygen Therapy): room air  Body mass index is 24.28 kg/m².  Physical Exam  Vitals and nursing note reviewed.   Constitutional:       General: He is awake. He is not in acute distress.  Cardiovascular:      Rate and Rhythm: Normal rate and regular rhythm.      Pulses: Normal pulses.      Heart sounds: Normal heart sounds.   Pulmonary:      Effort: Pulmonary effort is normal. No respiratory distress.      Breath sounds: Normal breath sounds.   Abdominal:      General: Bowel sounds are normal. There is no distension.      Palpations: Abdomen is soft.      Tenderness: There is no abdominal tenderness.   Musculoskeletal:         General: Tenderness (Left hip, covered with dressing, C/D/I) present.   Skin:     General: Skin is warm and dry.   Neurological:      Mental Status: He is alert.   Psychiatric:         Behavior: Behavior is cooperative.       Results Review     I reviewed the patient's new clinical results.  Results from last 7 days   Lab Units 24  0647 24  0502 24  04324  1854   WBC 10*3/mm3 12.50* 14.65* 13.72* 15.15*   HEMOGLOBIN g/dL 10.6* 11.2* 14.6 13.5   PLATELETS 10*3/mm3 191 191 257 277     Results from last 7 days   Lab Units 24  0647 24  0502 24  0437 24  1854   SODIUM mmol/L 137 134* 136 137   POTASSIUM mmol/L 4.3 4.5  4.0 4.6   CHLORIDE mmol/L 105 103 101 100   CO2 mmol/L 24.8 23.9 26.0 26.8   BUN mg/dL 17 18 18 19   CREATININE mg/dL 0.87 1.09 1.01 1.15   GLUCOSE mg/dL 103* 128* 137* 108*   EGFR mL/min/1.73 82.5 64.9 71.1 60.8     Results from last 7 days   Lab Units 05/21/24  1854   ALBUMIN g/dL 3.4*   BILIRUBIN mg/dL 0.2   ALK PHOS U/L 110   AST (SGOT) U/L 26   ALT (SGPT) U/L 12     Results from last 7 days   Lab Units 05/24/24  0647 05/23/24  0502 05/22/24  0437 05/21/24  1854   CALCIUM mg/dL 7.7* 8.2* 8.2* 8.6   ALBUMIN g/dL  --   --   --  3.4*       Hemoglobin A1C   Date/Time Value Ref Range Status   05/23/2024 0502 5.60 4.80 - 5.60 % Final       XR Hip With or Without Pelvis 1 View Left    Result Date: 5/22/2024  Postoperative changes from recent left total arthroplasty without findings of immediate complication.    This report was finalized on 5/22/2024 8:23 PM by Dr. Ck Mccartney M.D on Workstation: [id number]       I have personally reviewed all medications:  Scheduled Medications  aspirin, 81 mg, Oral, BID  atorvastatin, 20 mg, Oral, Nightly  docusate sodium, 100 mg, Oral, BID  ipratropium-albuterol, 3 mL, Nebulization, BID - RT  nicotine, 1 patch, Transdermal, Q24H  senna-docusate sodium, 2 tablet, Oral, BID  sodium chloride, 10 mL, Intravenous, Q12H    Infusions  sodium chloride, 75 mL/hr, Last Rate: 75 mL/hr (05/24/24 0028)    Diet  Diet: Regular/House; Fluid Consistency: Thin (IDDSI 0)    I have personally reviewed:  [x]  Laboratory   [x]  Microbiology   [x]  Radiology   [x]  EKG/Telemetry  [x]  Cardiology/Vascular   []  Pathology    []  Records       Assessment/Plan     Active Hospital Problems    Diagnosis  POA    **Closed left hip fracture [S72.002A]  Yes    Status post total hip replacement, left [Z96.642]  Not Applicable    DNR (do not resuscitate) [Z66]  Yes    Tobacco use [Z72.0]  Yes    Coronary artery disease involving native coronary artery of native heart without angina pectoris [I25.10]  Yes     Essential hypertension [I10]  Yes      Resolved Hospital Problems    Diagnosis Date Resolved POA    Closed fracture of neck of left femur [S72.002A] 05/22/2024 Yes       89 y.o. male admitted with Closed left hip fracture.    Left Femur Fracture: sp left total hip arthoplasty 05/22. Continue pain control. Orthopedic surgery following. PT/OT following, plan for SNF to [place] at discharge. ASA 81 mg BID for DVT prophylaxis.  HTN: BP soft on most recent labs, but slightly improved from prior. No evidence of overt hypotension at present, however, need to continue with close monitoring. Continue with IVF for now.   CAD: No active CP. No evidence of ACS, continue ASA.  Hyperglycemia: Noted on labs, A1c 5.6%, monitor for now.   Leukocytosis: Noted on labs, likely reactive, imaging did mention possible consolidation previously but patient without respiratory symptoms and he is afebrile. Values improving without antibiotics, continue monitoring for now. Repeat CBC in AM.   Anemia: Lower overall, likely multifactorial from perioperative blood loss from procedure and dilutional component from IVF. No acute intervention warranted at present, however, requires ongoing monitoring.      All workup, problems and plans new to me today. First day taking care of patient.    Aspirin 81 mg BID  for DVT prophylaxis.  Full code.  Discussed with patient and nursing staff.  Anticipate discharge to SNU facility tomorrow.  Expected discharge date/ time has not been documented.      Carlos Phillip DO  [facility]  05/24/24

## 2024-05-24 NOTE — CASE MANAGEMENT/SOCIAL WORK
Continued Stay Note  [facility]     Patient Name: Nickolas Drake  MRN: [id number]  Today's Date: 5/24/2024    Admit Date: 5/21/2024    Plan: [facility] van arranged for 12:00 pm on Saturday 5/25   Discharge Plan       Row Name 05/24/24 1123       Plan    Plan [facility] van arranged for 12:00 pm on Saturday 5/25    Plan Comments Per MD pt will d/c tomorrow Saturday 5/25. Spoke with daughter arianna who feels pt will need WC van. Arranged for 12;00pm with [facility] on 5/25. Packet in cubby.                   Discharge Codes    No documentation.                       CAITLIN Padgett

## 2024-05-24 NOTE — THERAPY TREATMENT NOTE
Patient Name: Nickolas Drake  : 1934    MRN: [id number]                              Today's Date: 2024       Admit Date: 2024    Visit Dx:     ICD-10-CM ICD-9-CM   1. Closed fracture of neck of left femur, initial encounter  S72.002A 820.8   2. Skin tear of left elbow without complication, initial encounter  S51.012A 881.01     Patient Active Problem List   Diagnosis    Dyslipidemia    CAD in native artery    Essential hypertension    Coronary artery disease involving native coronary artery of native heart without angina pectoris    Hyperlipemia    S/P drug eluting coronary stent placement    Tobacco use    Falls    Lightheadedness    Bradycardia, sinus    Syncope and collapse    Closed left hip fracture    DNR (do not resuscitate)    Status post total hip replacement, left     Past Medical History:   Diagnosis Date    Atherosclerotic heart disease     s/p anterior ST elevation MI 2013: mid LAD Xience xpedition 2.5 x 23, 2.5 x 12 mm stent. Proximal LAD 20% stenosis. Mid LCx 20-30% stenosis. Mid RCA 20% stenosis.    CAD (coronary artery disease)     Dyslipidemia     Health care maintenance     Hyperlipidemia     Hypertension     Ischemic cardiomyopathy     Near syncope     ST elevation (STEMI) myocardial infarction      Past Surgical History:   Procedure Laterality Date    CARDIAC CATHETERIZATION Left 2013    [facility], selective coronary angiogram, PCI stent of mid LAD, left ventricular angiogram, Dr. Janet Snowden    TOTAL HIP ARTHROPLASTY Left 2024    Procedure: LEFT TOTAL HIP ARTHROPLASTY ANTERIOR WITH HANA TABLE;  Surgeon: Leonela Sherwood MD;  Location: [facility];  Service: Orthopedics;  Laterality: Left;      General Information       Row Name 24 1423          Physical Therapy Time and Intention    Document Type therapy note (daily note)  -CS     Mode of Treatment individual therapy;physical therapy  -CS       Row Name 24 1427           General Information    Patient Profile Reviewed yes  -CS     Existing Precautions/Restrictions fall;left;hip, anterior  -CS       Row Name 05/24/24 1423          Cognition    Orientation Status (Cognition) oriented x 3  -CS       Row Name 05/24/24 1423          Safety Issues, Functional Mobility    Impairments Affecting Function (Mobility) balance;endurance/activity tolerance;pain;strength  -CS               User Key  (r) = Recorded By, (t) = Taken By, (c) = Cosigned By      Initials Name Provider Type    CS Leticia Ware PT Physical Therapist                   Mobility       Row Name 05/24/24 1427          Bed Mobility    Bed Mobility supine-sit  -CS     Supine-Sit [place] (Bed Mobility) moderate assist (50% patient effort);verbal cues  -CS     Assistive Device (Bed Mobility) bed rails;head of bed elevated  -CS     Comment, (Bed Mobility) cues for sequencing + increased time to complete; UIC at end of session  -CS       Row Name 05/24/24 1427          Sit-Stand Transfer    Sit-Stand [place] (Transfers) moderate assist (50% patient effort);verbal cues  -CS     Assistive Device (Sit-Stand Transfers) walker, front-wheeled  -CS       Row Name 05/24/24 1427          Gait/Stairs (Locomotion)    [place] Level (Gait) minimum assist (75% patient effort);verbal cues  -CS     Assistive Device (Gait) walker, front-wheeled  -CS     Distance in Feet (Gait) 20  -CS     Deviations/Abnormal Patterns (Gait) antalgic;adrian decreased;gait speed decreased;stride length decreased  -CS     Bilateral Gait Deviations forward flexed posture;heel strike decreased  -CS     Comment, (Gait/Stairs) slow antalgic gait; fatigues quickly limiting distance  -CS       Row Name 05/24/24 1427          Mobility    Extremity Weight-bearing Status left lower extremity  -CS     Left Lower Extremity (Weight-bearing Status) weight-bearing as tolerated (WBAT)  -CS               User Key  (r) = Recorded By, (t) = Taken By, (c) =  Cosigned By      Initials Name Provider Type    CS Leticia Ware, PT Physical Therapist                   Obj/Interventions       Row Name 05/24/24 1427          Motor Skills    Therapeutic Exercise other (see comments)  10 reps L SANDRINE protocol  -       Row Name 05/24/24 1427          Balance    Balance Assessment standing static balance;standing dynamic balance  -CS     Static Standing Balance contact guard  -CS     Dynamic Standing Balance minimal assist  -CS     Position/Device Used, Standing Balance supported;walker, front-wheeled  -CS               User Key  (r) = Recorded By, (t) = Taken By, (c) = Cosigned By      Initials Name Provider Type    CS Leticia Ware, PT Physical Therapist                   Goals/Plan    No documentation.                  Clinical Impression       Row Name 05/24/24 1428          Plan of Care Review    Plan of Care Reviewed With patient  -CS     Progress no change  -     Outcome Evaluation Pt received in bed and agreeable to PT. Pt completed SANDRINE protocol prior to mobility. Pt performed supine to sit and STS txf requiring mod A and increased time to complete. Pt ambulated 20' c RW requiring min. Pt continues to demo a slow antalgic gait but able to progress to a step-through pattern. Pt fatigues quickly with minimal activity which limited distance. Pt UIC with all needs in reach. Pt hopefully plans to D/C to SNF tomorrow.  -       Row Name 05/24/24 1428          Therapy Assessment/Plan (PT)    Criteria for Skilled Interventions Met (PT) yes;meets criteria  -CS     Therapy Frequency (PT) 6 times/wk  -       Row Name 05/24/24 1428          Positioning and Restraints    Pre-Treatment Position in bed  -CS     Post Treatment Position chair  -CS     In Chair reclined;call light within reach;encouraged to call for assist;exit alarm on;heels elevated;with family/caregiver  -CS               User Key  (r) = Recorded By, (t) = Taken By, (c) = Cosigned By      Initials Name Provider  Type    CS Leticia Ware, KRISHNA Physical Therapist                   Outcome Measures       Row Name 05/24/24 1429 05/24/24 0855       How much help from another person do you currently need...    Turning from your back to your side while in flat bed without using bedrails? 3  -CS 3  -ST    Moving from lying on back to sitting on the side of a flat bed without bedrails? 2  -CS 3  -ST    Moving to and from a bed to a chair (including a wheelchair)? 3  -CS 3  -ST    Standing up from a chair using your arms (e.g., wheelchair, bedside chair)? 3  -CS 3  -ST    Climbing 3-5 steps with a railing? 2  -CS 2  -ST    To walk in hospital room? 3  -CS 3  -ST    AM-PAC 6 Clicks Score (PT) 16  -CS 17  -ST    Highest Level of Mobility Goal 5 --> Static standing  -CS 5 --> Static standing  -ST      Row Name 05/24/24 1429          Functional Assessment    Outcome Measure Options AM-PAC 6 Clicks Basic Mobility (PT)  -CS               User Key  (r) = Recorded By, (t) = Taken By, (c) = Cosigned By      Initials Name Provider Type    Caroline Mayer RN Registered Nurse    Leticia Nielsen, KRISHNA Physical Therapist                                 Physical Therapy Education       Title: PT OT SLP Therapies (Done)       Topic: Physical Therapy (Done)       Point: Mobility training (Done)       Learning Progress Summary             Patient Acceptance, E,TB, VU,DU,NR by  at 5/24/2024 1430    Acceptance, E,TB, VU,DU,NR by CS at 5/23/2024 0901                         Point: Home exercise program (Done)       Learning Progress Summary             Patient Acceptance, E,TB, VU,DU,NR by CS at 5/24/2024 1430    Acceptance, E,TB, VU,DU,NR by CS at 5/23/2024 0901                         Point: Body mechanics (Done)       Learning Progress Summary             Patient Acceptance, E,TB, VU,DU,NR by CS at 5/24/2024 1430    Acceptance, E,TB, VU,DU,NR by CS at 5/23/2024 0901                         Point: Precautions (Done)       Learning Progress Summary              Patient Acceptance, E,TB, VU,DU,NR by  at 5/24/2024 1430    Acceptance, E,TB, VU,DU,NR by  at 5/23/2024 0901                                         User Key       Initials Effective Dates Name Provider Type Discipline     09/22/22 -  Leticia Ware, PT Physical Therapist PT                  PT Recommendation and Plan     Plan of Care Reviewed With: patient  Progress: no change  Outcome Evaluation: Pt received in bed and agreeable to PT. Pt completed SANDRINE protocol prior to mobility. Pt performed supine to sit and STS txf requiring mod A and increased time to complete. Pt ambulated 20' c RW requiring min. Pt continues to demo a slow antalgic gait but able to progress to a step-through pattern. Pt fatigues quickly with minimal activity which limited distance. Pt UIC with all needs in reach. Pt hopefully plans to D/C to SNF tomorrow.     Time Calculation:         PT Charges       Row Name 05/24/24 1430             Time Calculation    Start Time 1403  -CS      Stop Time 1418  -CS      Time Calculation (min) 15 min  -CS      PT Received On 05/24/24  -CS      PT - Next Appointment 05/25/24  -CS         Time Calculation- PT    Total Timed Code Minutes- PT 14 minute(s)  -CS         Timed Charges    49198 - PT Therapeutic Activity Minutes 14  -CS         Total Minutes    Timed Charges Total Minutes 14  -CS       Total Minutes 14  -CS                User Key  (r) = Recorded By, (t) = Taken By, (c) = Cosigned By      Initials Name Provider Type    CS Leticia Ware, PT Physical Therapist                  Therapy Charges for Today       Code Description Service Date Service Provider Modifiers Qty    73272925810 HC PT THERAPEUTIC ACT EA 15 MIN 5/23/2024 Leticia Ware, PT GP 1    24605387899 HC PT EVAL LOW COMPLEXITY 3 5/23/2024 Leticia Ware, PT GP 1    93835762240 HC PT THERAPEUTIC ACT EA 15 MIN 5/24/2024 Leticia Ware, PT GP 1            PT G-Codes  Outcome Measure Options: AM-PAC 6 Clicks Basic  Mobility (PT)  AM-PAC 6 Clicks Score (PT): 16  PT Discharge Summary  Anticipated Discharge Disposition (PT): skilled nursing facility    Leticia Ware, KRISHNA  5/24/2024

## 2024-05-24 NOTE — PLAN OF CARE
Goal Outcome Evaluation:           Progress: improving  Outcome Evaluation: Patient with minimal c/o pain. Voiding via urinal. BM today. NS infusing at 75mL/hr. Plans to discharge tomorrow to [facility] at noon.

## 2024-05-24 NOTE — PLAN OF CARE
Goal Outcome Evaluation:  Plan of Care Reviewed With: patient        Progress: improving  Outcome Evaluation: Pt remains stable. Percocet given x1, pt resting well. Working with PT, ambulating with 1 assist. Voiding without difficulty. Educated on htn management. Declining stool softeners thus far, last BM 5/21. DC to rehab on Sat.

## 2024-05-24 NOTE — PROGRESS NOTES
"      Patient: Nickolas Drake  YOB: 1934     Date of Admission: 5/21/2024  5:58 PM Medical Record Number: [id number]     Attending Physician: Carlos Phillip DO    Procedure(s):  LEFT TOTAL HIP ARTHROPLASTY ANTERIOR WITH HANA TABLE Post Operative Day Number: 2    Subjective : No new orthopaedic complaints     Pain Relief: some relief with present medication.     Systemic Complaints: No Complaints  Vitals:    05/23/24 2100 05/24/24 0620 05/24/24 0759 05/24/24 0944   BP: 96/63 118/69  105/69   BP Location: Right arm Right arm  Right arm   Patient Position: Lying Lying  Lying   Pulse: 91 79 80 79   Resp: 18 18 20 18   Temp: 98.2 °F (36.8 °C) 98.7 °F (37.1 °C)  98.2 °F (36.8 °C)   TempSrc: Oral Oral  Oral   SpO2: 91% 90% 90% 96%   Weight:       Height:           Physical Exam: 89 y.o. male    General Appearance:       Alert, cooperative, in no acute distress                  Extremities:    Dressing Clean, Dry and Intact         Incision healthy without signs or symptoms of infections         No clinical sign of DVT        Able to do good movements of digits    Pulses:   Pulses palpable and equal bilaterally           Diagnostic Tests:     Results from last 7 days   Lab Units 05/24/24  0647 05/23/24  0502 05/22/24  0437   WBC 10*3/mm3 12.50* 14.65* 13.72*   HEMOGLOBIN g/dL 10.6* 11.2* 14.6   HEMATOCRIT % 30.6* 32.3* 42.6   PLATELETS 10*3/mm3 191 191 257     Results from last 7 days   Lab Units 05/24/24  0647 05/23/24  0502 05/22/24  0437   SODIUM mmol/L 137 134* 136   POTASSIUM mmol/L 4.3 4.5 4.0   CHLORIDE mmol/L 105 103 101   CO2 mmol/L 24.8 23.9 26.0   BUN mg/dL 17 18 18   CREATININE mg/dL 0.87 1.09 1.01   GLUCOSE mg/dL 103* 128* 137*   CALCIUM mg/dL 7.7* 8.2* 8.2*         No results found for: \"CRP\"  No results found for: \"SEDRATE\"  No results found for: \"URICACID\"  No results found for: \"CRYSTAL\"  Microbiology Results (last 10 days)       ** No results found for the last 240 hours. **          XR " Hip With or Without Pelvis 1 View Left    Result Date: 5/22/2024  Postoperative changes from recent left total arthroplasty without findings of immediate complication.    This report was finalized on 5/22/2024 8:23 PM by Dr. Ck Mccartney M.D on Workstation: [id number]      XR Chest 1 View    Result Date: 5/21/2024  1. Small area of increased density in the right lung base may represent chronic parenchymal change versus mild atelectasis or pneumonia. 2. No other acute infiltrates are seen.   This report was finalized on 5/21/2024 7:24 PM by Dr. Chris Swartz M.D on Workstation: [id number]      XR Hip With or Without Pelvis 2 - 3 View Left    Result Date: 5/21/2024  1. Left femoral neck fracture.   This report was finalized on 5/21/2024 6:55 PM by Dr. Chris Swartz M.D on Workstation: [id number]             Current Medications:  Scheduled Meds:aspirin, 81 mg, Oral, BID  atorvastatin, 20 mg, Oral, Nightly  docusate sodium, 100 mg, Oral, BID  ipratropium-albuterol, 3 mL, Nebulization, BID - RT  nicotine, 1 patch, Transdermal, Q24H  senna-docusate sodium, 2 tablet, Oral, BID  sodium chloride, 10 mL, Intravenous, Q12H      Continuous Infusions:   PRN Meds:.  acetaminophen **OR** acetaminophen **OR** acetaminophen    acetaminophen    senna-docusate sodium **AND** polyethylene glycol **AND** bisacodyl **AND** bisacodyl    Calcium Replacement - Follow Nurse / BPA Driven Protocol    HYDROmorphone **AND** naloxone    ipratropium-albuterol    Magnesium Standard Dose Replacement - Follow Nurse / BPA Driven Protocol    nicotine polacrilex    ondansetron ODT **OR** ondansetron    oxyCODONE-acetaminophen    Phosphorus Replacement - Follow Nurse / BPA Driven Protocol    Potassium Replacement - Follow Nurse / BPA Driven Protocol    sodium chloride    sodium chloride    Assessment:    Procedure(s):  LEFT TOTAL HIP ARTHROPLASTY ANTERIOR WITH HANA TABLE      Closed left hip fracture    Essential hypertension    Coronary artery  disease involving native coronary artery of native heart without angina pectoris    Tobacco use    DNR (do not resuscitate)    Status post total hip replacement, left      PLAN:   Continues current post-op course  Anticoagulation: Aspirin started  Mobilize with PT as tolerated per protocol    Weight Bearing: WBAT  Discharge Plan: OK to plan for discharge in  tomorrow to SNF  from orthopadic perspective.      Leonela Sherwood MD    Date: 5/24/2024    Time: 13:50 EDT

## 2024-05-24 NOTE — DISCHARGE INSTRUCTIONS
Discharge and Follow up Instructions:      Hip Fracture Discharge Instructions:    I. ACTIVITIES:  1. Exercises:  Complete exercise program as taught by the hospital physical therapist 2 times per day  Exercise program will be advanced by the physical therapist  During the day be up ambulating every 2 hours (while awake) for short distances  Complete the ankle pump exercises at least 10 times per hour (while awake)  Elevate legs when in bed and for at least 30 minutes during the day.Use cold packs 20-30 minutes approximately 5 times per day. This should be done before and after completing your exercises and at any time you are experiencing pain/ stiffness in your operative extremity.      2. Activities of Daily Living:  No tub baths, hot tubs, or swimming pools for 4 weeks  May shower and let water run over the incision on post-operative day #5 if no drainage. Do not scrub or rub the incision. Simply let the water run over the incision and pat dry.    II. Restrictions  Follow   Your surgeon will discuss with you when you will be able to drive again. Usual guidelines are you are to be off pain medications prior to driving.  Weight bearing is WBAT  First week stay inside on even terrain. May go up and down stairs one stair at a time utilizing the hand rail once cleared by physical therapy to do so.  After one week, you may venture outside (if cleared to do so by physical therapist).    III. Precautions:  Everyone that comes near you should wash their hands  No elective dental, genital-urinary, or colon procedures or surgical procedures for 12 weeks after surgery unless absolutely necessary.   If dental work or surgical procedure is deemed absolutely necessary, you will need to contact your surgeon as you will need to take antibiotics 1 hour prior to any dental work (including teeth cleanings).  Please discuss with your surgeon prophylactic antibiotics as the length of time this intervention will be necessary for you  varies with each patient’s health history and situation.  Avoid sick people. If you must be around someone who is ill, they should wear a mask.  Avoid visits to the Emergency Room or Urgent Care. If you feel you need to go to the Emergency Room, please notify your Surgeon's office.  Stockings are to be worn for one week after surgery and are to be placed on in the morning and removed at night. Observe your skin when stocking is removed for any problems. Monitor the stockings to ensure that any swelling is not causing the stockings to become too tight. In this case, remove stockings immediately.      IV. INCISION CARE:  Wash your hands prior to dressing changes  Change the dressing as needed to keep incision clean and dry. Utilize dry gauze and paper tape. Avoid touching the side of the gauze that goes against the incision with your hands.  No creams or ointments to the incision  May remove dressing once the incision is free of drainage  Do not touch or pick at the incision  Check incision every day and notify surgeon immediately if any of the following signs or symptoms are noted:  Increase in redness  Increase in swelling around the incision and of the entire extremity  Increase in pain  Drainage oozing from the incision  Pulling apart of the edges of the incision  Increase in overall body temperature (greater than 100.5 degrees)    OK to remove the island dressing in one week. You have absorbable sutures with steristrips/exofin fusion deep to the island,     please do not remove the steri strips/ exofin fusion for 14 days, you can shower on them 6 days after surgery.      V. Medications:   1. Anticoagulants: You will be discharged on an anticoagulant. This is a prophylactic medication that helps prevent blood clots during your post-operative period.  You will be on aspirin 81 mg 2 times a day for 30 days.   While taking the anticoagulant, you should avoid taking any additional aspirin, ibuprofen (Advil or Motrin),  Aleve (Naprosyn) or other non-steroidal anti-inflammatory medications.   Notify surgeon immediately if any jerry bleeding is noted in the urine, stool, emesis, or from the nose or the incision. Blood in the stool will often appear as black rather than red. Blood in urine may appear as pink. Blood in emesis may appear as brown/black like coffee grounds.  You will need to apply pressure for longer periods of time to any cuts or abrasions to stop bleeding  Avoid alcohol while taking anticoagulants    2. Stool Softeners: You will be at greater risk of constipation after surgery due to being less mobile and the pain medications.   Take stool softeners as instructed by your surgeon while on pain medications. Over the counter Colace 100 mg 1-2 capsules twice daily.   If stools become too loose or too frequent, please decreases the dosage or stop the stool softener.  If constipation occurs despite use of stool softeners, you are to continue the stool softeners and add a laxative (Milk of Magnesia 1 ounce daily as needed).  Dulcolax oral tabs or suppository, or a fleets enema can also be utilized for constipation and can be obtained over the counter.   If above interventions are unsuccessful in inducing bowel movements, please contact your surgeon's office / family physician's office.  Drink plenty of fluids, and eat fruits and vegetables during your recovery time    3. Pain Medications utilized after surgery are narcotics and the law requires that the following information be given to all patients that are prescribed narcotics:  CLASSIFICATION: Pain medications are called Opioids and are narcotics  LEGALITIES: It is illegal to share narcotics with others and to drive within 24 hours of taking narcotics  POTENTIAL SIDE EFFECTS: Potential side effects of opioids include: nausea, vomiting, itching, dizziness, drowsiness, dry mouth, constipation, and difficulty urinating.  POTENTIAL ADVERSE EFFECTS:   Opioid tolerance can  develop with use of pain medications and this simply means that it requires more and more of the medication to control pain; however, this is seen more in patients that use opioids for longer periods of time.  Opioid dependence can develop with use of Opioids and this simply means that to stop the medication can cause withdrawal symptoms; however, this is seen with patients that use Opioids for longer periods of time.  Opioid addiction can develop with use of Opioids and the incidence of this is very unlikely in patients who take the medications as ordered and stop the medications as instructed.  Opioid overdose can be dangerous, but is unlikely when the medication is taken as ordered and stopped when ordered. It is important not to mix opioids with alcohol or with and type of sedative such as Benadryl as this can lead to over sedation and respiratory difficulty.  DOSAGE:   Pain medications will need to be taken consistently for the first week to decrease pain and promote adequate pain relief and participation in physical therapy.  After the initial surgical pain begins to resolve, you may begin to decrease the pain medication. By the end of 6 weeks, you should be off of pain medications.  Refills will not be given by the office during evening hours, on weekends, or after 6 weeks post-op.  To seek refills on pain medications during the initial 6 week post-operative period, you must call the office 48 hours in advance to request the refill. The office will then notify you when to  the prescription. DO NOT wait until you are out of the medication to request a refill.    V. FOLLOW-UP VISITS:  You will need to follow up in the office with your surgeon on Delilah 10, 2024. Please call this number [phone]  to schedule this appointment.  If you have any concerns or suspected complications prior to your follow up visit, please call your surgeons office. Do not wait until your appointment time if you suspect  complications. These will need to be addressed in the office promptly.

## 2024-05-25 VITALS
SYSTOLIC BLOOD PRESSURE: 116 MMHG | OXYGEN SATURATION: 95 % | TEMPERATURE: 97.9 F | RESPIRATION RATE: 18 BRPM | DIASTOLIC BLOOD PRESSURE: 60 MMHG | BODY MASS INDEX: 24.37 KG/M2 | WEIGHT: 174.1 LBS | HEART RATE: 65 BPM | HEIGHT: 71 IN

## 2024-05-25 LAB
ANION GAP SERPL CALCULATED.3IONS-SCNC: 6 MMOL/L (ref 5–15)
BASOPHILS # BLD AUTO: 0.07 10*3/MM3 (ref 0–0.2)
BASOPHILS NFR BLD AUTO: 0.6 % (ref 0–1.5)
BUN SERPL-MCNC: 15 MG/DL (ref 8–23)
BUN/CREAT SERPL: 17.4 (ref 7–25)
CALCIUM SPEC-SCNC: 7.7 MG/DL (ref 8.6–10.5)
CHLORIDE SERPL-SCNC: 103 MMOL/L (ref 98–107)
CO2 SERPL-SCNC: 28 MMOL/L (ref 22–29)
CREAT SERPL-MCNC: 0.86 MG/DL (ref 0.76–1.27)
DEPRECATED RDW RBC AUTO: 42.6 FL (ref 37–54)
EGFRCR SERPLBLD CKD-EPI 2021: 82.8 ML/MIN/1.73
EOSINOPHIL # BLD AUTO: 0.66 10*3/MM3 (ref 0–0.4)
EOSINOPHIL NFR BLD AUTO: 5.2 % (ref 0.3–6.2)
ERYTHROCYTE [DISTWIDTH] IN BLOOD BY AUTOMATED COUNT: 12.1 % (ref 12.3–15.4)
GLUCOSE SERPL-MCNC: 108 MG/DL (ref 65–99)
HCT VFR BLD AUTO: 28.9 % (ref 37.5–51)
HGB BLD-MCNC: 9.9 G/DL (ref 13–17.7)
IMM GRANULOCYTES # BLD AUTO: 0.08 10*3/MM3 (ref 0–0.05)
IMM GRANULOCYTES NFR BLD AUTO: 0.6 % (ref 0–0.5)
LYMPHOCYTES # BLD AUTO: 2.18 10*3/MM3 (ref 0.7–3.1)
LYMPHOCYTES NFR BLD AUTO: 17.3 % (ref 19.6–45.3)
MCH RBC QN AUTO: 33.1 PG (ref 26.6–33)
MCHC RBC AUTO-ENTMCNC: 34.3 G/DL (ref 31.5–35.7)
MCV RBC AUTO: 96.7 FL (ref 79–97)
MONOCYTES # BLD AUTO: 1.81 10*3/MM3 (ref 0.1–0.9)
MONOCYTES NFR BLD AUTO: 14.3 % (ref 5–12)
NEUTROPHILS NFR BLD AUTO: 62 % (ref 42.7–76)
NEUTROPHILS NFR BLD AUTO: 7.82 10*3/MM3 (ref 1.7–7)
NRBC BLD AUTO-RTO: 0 /100 WBC (ref 0–0.2)
PLATELET # BLD AUTO: 188 10*3/MM3 (ref 140–450)
PMV BLD AUTO: 9.6 FL (ref 6–12)
POTASSIUM SERPL-SCNC: 4 MMOL/L (ref 3.5–5.2)
RBC # BLD AUTO: 2.99 10*6/MM3 (ref 4.14–5.8)
SODIUM SERPL-SCNC: 137 MMOL/L (ref 136–145)
WBC NRBC COR # BLD AUTO: 12.62 10*3/MM3 (ref 3.4–10.8)

## 2024-05-25 PROCEDURE — 85025 COMPLETE CBC W/AUTO DIFF WBC: CPT | Performed by: STUDENT IN AN ORGANIZED HEALTH CARE EDUCATION/TRAINING PROGRAM

## 2024-05-25 PROCEDURE — 94664 DEMO&/EVAL PT USE INHALER: CPT

## 2024-05-25 PROCEDURE — 94799 UNLISTED PULMONARY SVC/PX: CPT

## 2024-05-25 PROCEDURE — 94761 N-INVAS EAR/PLS OXIMETRY MLT: CPT

## 2024-05-25 PROCEDURE — 80048 BASIC METABOLIC PNL TOTAL CA: CPT | Performed by: STUDENT IN AN ORGANIZED HEALTH CARE EDUCATION/TRAINING PROGRAM

## 2024-05-25 RX ORDER — IPRATROPIUM BROMIDE AND ALBUTEROL SULFATE 2.5; .5 MG/3ML; MG/3ML
3 SOLUTION RESPIRATORY (INHALATION) EVERY 6 HOURS PRN
Start: 2024-05-25

## 2024-05-25 RX ORDER — POLYETHYLENE GLYCOL 3350 17 G/17G
17 POWDER, FOR SOLUTION ORAL DAILY PRN
Qty: 30 EACH | Refills: 0 | Status: SHIPPED | OUTPATIENT
Start: 2024-05-25

## 2024-05-25 RX ORDER — NICOTINE 21 MG/24HR
1 PATCH, TRANSDERMAL 24 HOURS TRANSDERMAL EVERY 24 HOURS
Qty: 30 EACH | Refills: 0 | Status: SHIPPED | OUTPATIENT
Start: 2024-05-25

## 2024-05-25 RX ORDER — BISACODYL 5 MG/1
5 TABLET, DELAYED RELEASE ORAL DAILY PRN
Qty: 30 TABLET | Refills: 0 | Status: SHIPPED | OUTPATIENT
Start: 2024-05-25

## 2024-05-25 RX ORDER — ASPIRIN 81 MG/1
81 TABLET ORAL 2 TIMES DAILY
Qty: 60 TABLET | Refills: 1 | Status: SHIPPED | OUTPATIENT
Start: 2024-05-25

## 2024-05-25 RX ORDER — PSEUDOEPHEDRINE HCL 30 MG
100 TABLET ORAL 2 TIMES DAILY
Qty: 32 CAPSULE | Refills: 0 | Status: SHIPPED | OUTPATIENT
Start: 2024-05-25 | End: 2024-06-10

## 2024-05-25 RX ORDER — OXYCODONE AND ACETAMINOPHEN 7.5; 325 MG/1; MG/1
1 TABLET ORAL EVERY 6 HOURS PRN
Qty: 8 TABLET | Refills: 0 | Status: SHIPPED | OUTPATIENT
Start: 2024-05-25

## 2024-05-25 RX ORDER — AMOXICILLIN 250 MG
2 CAPSULE ORAL 2 TIMES DAILY
Qty: 60 TABLET | Refills: 0 | Status: SHIPPED | OUTPATIENT
Start: 2024-05-25

## 2024-05-25 RX ORDER — ACETAMINOPHEN 325 MG/1
650 TABLET ORAL EVERY 4 HOURS PRN
Qty: 60 TABLET | Refills: 0 | Status: SHIPPED | OUTPATIENT
Start: 2024-05-25

## 2024-05-25 RX ADMIN — IPRATROPIUM BROMIDE AND ALBUTEROL SULFATE 3 ML: 2.5; .5 SOLUTION RESPIRATORY (INHALATION) at 08:40

## 2024-05-25 RX ADMIN — ASPIRIN 81 MG: 81 TABLET, COATED ORAL at 08:51

## 2024-05-25 RX ADMIN — Medication 10 ML: at 08:51

## 2024-05-25 RX ADMIN — DOCUSATE SODIUM 100 MG: 100 CAPSULE, LIQUID FILLED ORAL at 08:51

## 2024-05-25 NOTE — PLAN OF CARE
Goal Outcome Evaluation:  Plan of Care Reviewed With: patient        Progress: improving  Outcome Evaluation: Pt remains stable. Up with 1 assist. Voiding without difficulty, had BM yesterday. No c/o pain. Dressing to L hip is cdi. Educated on htn management. Plans to dc to rehab with  van  at 12pm today.

## 2024-05-25 NOTE — CASE MANAGEMENT/SOCIAL WORK
Case Management Discharge Note      Final Note: DC to SNF         Selected Continued Care - Discharged on 5/25/2024 Admission date: 5/21/2024 - Discharge disposition: Skilled Nursing Facility (DC - External)      Destination Coordination complete.      Service Provider Selected Services Address Phone Fax Patient Preferred    [facility] [address] [address] [phone] --              Durable Medical Equipment    No services have been selected for the patient.                Dialysis/Infusion    No services have been selected for the patient.                Home Medical Care    No services have been selected for the patient.                Therapy    No services have been selected for the patient.                Community Resources    No services have been selected for the patient.                Community & DME    No services have been selected for the patient.                         Final Discharge Disposition Code: 03 - skilled nursing facility (SNF)

## 2024-05-25 NOTE — DISCHARGE SUMMARY
Patient Name: Nickolas Drake  : 1934  MRN: [id number]    Date of Admission: 2024  Date of Discharge:  2024  Primary Care Physician: Carmela Leija MD      Chief Complaint:   Fall and Hip Pain      Discharge Diagnoses     Active Hospital Problems    Diagnosis  POA    **Closed left hip fracture [S72.002A]  Yes    Status post total hip replacement, left [Z96.642]  Not Applicable    DNR (do not resuscitate) [Z66]  Yes    Tobacco use [Z72.0]  Yes    Coronary artery disease involving native coronary artery of native heart without angina pectoris [I25.10]  Yes    Essential hypertension [I10]  Yes      Resolved Hospital Problems    Diagnosis Date Resolved POA    Closed fracture of neck of left femur [S72.002A] 2024 Yes        Hospital Course     Mr. Drake is a 89 y.o. male with a history of CAD, HTN, tobacco use who presented to [facility] initially complaining of hip pain after suffering a fall. Please see the admitting history and physical for further details.  He was found to have left femoral neck fracture and was admitted to the hospital for further evaluation and treatment.     Left Femur Fracture  - Left femoral neck fracture noted on imaging, orthopedic surgery consulted and followed, he underwent left total hip arthoplasty .  Did well post-operatively, was started on ASA 81 mg BID for DVT prophylaxis, worked with PT. Plan for discharge to SNF. Evaluated by Orthopedic surgery prior to discharge, cleared from their perspective, recommending continuation of aspirin for DVT prophylaxis as ordered, weight bearing as tolerated. He will need outpatient follow up with orthopedic surgery in 1-2 weeks after discharge or as scheduled for reassessment. As needed medications ordered for pain control, bowel regimen ordered to prevent constipation.    Hypertension  - Blood pressure appears stable and acceptable acutely at this time. Patient not on medications.  Recommend that patient check his blood pressure 2-3 times daily and record those values in log book and take with him to next PCP visit to allow for greater insight into treatment efficacy to guide further management decisions. Recommend heart healthy/low sodium diet.    Coronary artery disease  - Patient appears stable from this perspective at this time, denies any chest pain, dyspnea, palpitations, no signs/symptoms to suggest ACS. Continue aspirin as prescribed for DVT prophylaxis, can transition back to once daily aspirin once okay with orthopedic surgery, as it pertains to DVT prophylaxis. Recommend follow up with PCP to address this.    Hyperglycemia  - Noted previously, better controlled prior to discharge. A1c 5.6%. Recommend that patient check his blood glucose 2-3 times daily and record those values in log book and take with him to next PCP visit to allow for greater insight into treatment efficacy to guide further management decisions. Recommend consistent carbohydrate/diabetic diet.    Leukocytosis  - WBC count elevated on most recent labs, etiology likely reactive in setting of fall/trauma. Patient afebrile, no other signs or symptoms to suggest acute infection at this time. Prior imaging did mention possible consolidation previously but patient without respiratory symptoms  However, do recommend close monitoring after discharge.  Recommend repeat CBC within 1 to 2 days at facility and again with PCP within 1 week after discharge for reassessment to guide further management decisions.    Anemia  - Hemoglobin lower overall but not significantly changed from most recent prior labs, likely multifactorial from perioperative blood loss from procedure and dilutional component from IVF. No acute intervention warranted at present, however, requires ongoing monitoring. Recommend repeat CBC within 1 to 2 days at facility and again with PCP within 1 week after discharge for reassessment to guide further management  decisions.     Tobacco use  - Cessation counseling, nicotine replacement therapy    Day of Discharge     Subjective:  Patient seen and examined this morning.  Hospital day 4.  He is awake and resting in bed, states he is doing well at present, having bowel movements, pain controlled.  Plan for discharge to SNF today.    Physical Exam:  Temp:  [97.9 °F (36.6 °C)-98.6 °F (37 °C)] 97.9 °F (36.6 °C)  Heart Rate:  [69-86] 69  Resp:  [18] 18  BP: (105-126)/(66-73) 121/66  Body mass index is 24.28 kg/m².  Physical Exam  Vitals and nursing note reviewed.   Constitutional:       General: He is awake. He is not in acute distress.  Cardiovascular:      Rate and Rhythm: Normal rate and regular rhythm.      Pulses: Normal pulses.      Heart sounds: Normal heart sounds.   Pulmonary:      Effort: Pulmonary effort is normal. No respiratory distress.      Breath sounds: Normal breath sounds. No wheezing or rhonchi.   Abdominal:      Palpations: Abdomen is soft.      Tenderness: There is no abdominal tenderness.   Musculoskeletal:      Comments: Left hip tenderness improved from prior, covered with dressing, C/D/I   Skin:     General: Skin is warm and dry.   Neurological:      Mental Status: He is alert.   Psychiatric:         Behavior: Behavior is cooperative.         Consultants     Consult Orders (all) (From admission, onward)       Start     Ordered    05/22/24 2030  Inpatient Consult to Case Management   Once        Comments: Equipment, Home health / Outpatient PT   Provider:  (Not yet assigned)    05/22/24 2029 05/22/24 1404  Inpatient consult to Anesthesiology  Once,   Status:  Canceled        Specialty:  Anesthesiology  Provider:  (Not yet assigned)    05/22/24 1403    05/22/24 0702  Inpatient Orthopedic Surgery Consult  IN AM        Specialty:  Orthopedic Surgery  Provider:  Leonela Sherwood MD    05/21/24 2330    05/21/24 2243  Inpatient Case Management  Consult  Once         Provider:  (Not yet assigned)    05/21/24 2243 05/21/24 1930  [facility] (on-call MD unless specified) Details  Once        Specialty:  Hospitalist  Provider:  (Not yet assigned)    05/21/24 1929 05/21/24 1930  Ortho (on-call MD unless specified)  Once        Specialty:  Orthopedic Surgery  Provider:  (Not yet assigned)    05/21/24 1929                  Procedures     LEFT TOTAL HIP ARTHROPLASTY ANTERIOR WITH HANA TABLE    Imaging Results (All)       Procedure Component Value Units Date/Time    XR Hip With or Without Pelvis 1 View Left [[id number]] Collected: 05/22/24 2020     Updated: 05/22/24 2026    Narrative:      Left hip radiograph     HISTORY: Postop     TECHNIQUE: AP pelvis and AP radiograph of the left hip     COMPARISON: Left hip radiograph 5/21/2024     FINDINGS:  Post surgical changes from recent left total hip arthroplasty are  present. The hardware is intact. There is no new periprosthetic  fracture.       Impression:      Postoperative changes from recent left total arthroplasty  without findings of immediate complication.           This report was finalized on 5/22/2024 8:23 PM by Dr. Ck Mccartney M.D  on Workstation: [id number]       FL C Arm During Surgery [[id number]] Resulted: 05/22/24 1727     Updated: 05/22/24 1727    Narrative:      This procedure was auto-finalized with no dictation required.    XR Chest 1 View [[id number]] Collected: 05/21/24 1924     Updated: 05/21/24 1928    Narrative:      XR CHEST 1 VW-5/21/2024     HISTORY: Preop.     Heart size is within normal limits. There is mild interstitial  prominence of the lungs including somewhat confluent area of increased  density in the right base which is slightly more prominent than on the  3/18/2029 study and could represent some progression of interstitial  fibrosis versus some mild atelectasis or developing pneumonia. Lungs are  otherwise free of acute infiltrates.       Impression:      1. Small area of increased density in the  "right lung base may represent  chronic parenchymal change versus mild atelectasis or pneumonia.  2. No other acute infiltrates are seen.        This report was finalized on 5/21/2024 7:24 PM by Dr. Chris Swartz M.D on Workstation: [id number]       XR Hip With or Without Pelvis 2 - 3 View Left [[id number]] Collected: 05/21/24 1851     Updated: 05/21/24 1858    Narrative:      XR HIP W OR WO PELVIS 2-3 VIEW LEFT-5/21/2024     HISTORY: Fell, left hip pain.     There is a mildly displaced fracture of the left femoral neck. Femoral  head articulates with the acetabulum. No other fractures are seen in the  pelvis.       Impression:      1. Left femoral neck fracture.        This report was finalized on 5/21/2024 6:55 PM by Dr. Chris Swartz M.D on Workstation: [id number]               Results for orders placed in visit on 04/30/14    SCANNED - ECHOCARDIOGRAM    Pertinent Labs     Results from last 7 days   Lab Units 05/25/24 0319 05/24/24  0647 05/23/24  0502 05/22/24  0437   WBC 10*3/mm3 12.62* 12.50* 14.65* 13.72*   HEMOGLOBIN g/dL 9.9* 10.6* 11.2* 14.6   PLATELETS 10*3/mm3 188 191 191 257     Results from last 7 days   Lab Units 05/25/24 0319 05/24/24  0647 05/23/24  0502 05/22/24  0437   SODIUM mmol/L 137 137 134* 136   POTASSIUM mmol/L 4.0 4.3 4.5 4.0   CHLORIDE mmol/L 103 105 103 101   CO2 mmol/L 28.0 24.8 23.9 26.0   BUN mg/dL 15 17 18 18   CREATININE mg/dL 0.86 0.87 1.09 1.01   GLUCOSE mg/dL 108* 103* 128* 137*   EGFR mL/min/1.73 82.8 82.5 64.9 71.1     Results from last 7 days   Lab Units 05/21/24  1854   ALBUMIN g/dL 3.4*   BILIRUBIN mg/dL 0.2   ALK PHOS U/L 110   AST (SGOT) U/L 26   ALT (SGPT) U/L 12     Results from last 7 days   Lab Units 05/25/24 0319 05/24/24  0647 05/23/24  0502 05/22/24  0437 05/21/24  1854   CALCIUM mg/dL 7.7* 7.7* 8.2* 8.2* 8.6   ALBUMIN g/dL  --   --   --   --  3.4*               Invalid input(s): \"LDLCALC\"          Test Results Pending at Discharge       Discharge Details "        Discharge Medications        New Medications        Instructions Start Date   acetaminophen 325 MG tablet  Commonly known as: TYLENOL   650 mg, Oral, Every 4 Hours PRN      aspirin 81 MG EC tablet  Replaces: aspirin 81 MG tablet   81 mg, Oral, 2 Times Daily      bisacodyl 5 MG EC tablet  Commonly known as: DULCOLAX   5 mg, Oral, Daily PRN      docusate sodium 100 MG capsule   100 mg, Oral, 2 Times Daily      ipratropium-albuterol 0.5-2.5 mg/3 ml nebulizer  Commonly known as: DUO-NEB   3 mL, Nebulization, Every 6 Hours PRN      nicotine 21 MG/24HR patch  Commonly known as: NICODERM CQ   1 patch, Transdermal, Every 24 Hours      nicotine polacrilex 2 MG gum  Commonly known as: NICORETTE   2 mg, Mouth/Throat, Every 1 Hour PRN      oxyCODONE-acetaminophen 7.5-325 MG per tablet  Commonly known as: PERCOCET   1 tablet, Oral, Every 6 Hours PRN      polyethylene glycol 17 g packet  Commonly known as: MIRALAX   17 g, Oral, Daily PRN      sennosides-docusate 8.6-50 MG per tablet  Commonly known as: PERICOLACE   2 tablets, Oral, 2 Times Daily             Continue These Medications        Instructions Start Date   fish oil 1000 MG capsule capsule   1 capsule, Oral, Daily      PRESERVISION AREDS 2 PO   Oral             Stop These Medications      aspirin 81 MG tablet  Replaced by: aspirin 81 MG EC tablet              No Known Allergies    Discharge Disposition:  Skilled Nursing Facility (DC - External)      Discharge Diet:  Diet Order   Procedures    Diet: Regular/House; Fluid Consistency: Thin (IDDSI 0)       Discharge Activity:   Activity Instructions       Up WIth Assist              CODE STATUS:    Code Status and Medical Interventions:   Ordered at: 05/22/24 2029     Level Of Support Discussed With:    Patient     Code Status (Patient has no pulse and is not breathing):    CPR (Attempt to Resuscitate)     Medical Interventions (Patient has pulse or is breathing):    Full       No future appointments.  Additional  Instructions for the Follow-ups that You Need to Schedule       Discharge Follow-up with PCP   As directed       Currently Documented PCP:    Carmela Leija MD    PCP Phone Number:    [phone]     Follow Up Details: Within 1 week after discharge for reassessment, medication and symptom review, repeat labs, blood pressure check        Discharge Follow-up with Specialty: Orthopedic surgery   As directed      Specialty: Orthopedic surgery   Follow Up Details: Within 1 to 2 weeks after discharge or as scheduled.  Please call the office to ensure follow-up appointment is made.               Contact information for follow-up providers       Leonela Sherwood MD Follow up on 6/10/2024.    Specialty: Orthopedic Surgery  Contact information:  [address] [facility]  [address]  [place] [address]  [phone]               Carmela Leija MD .    Specialty: Family Medicine  Why: Within 1 week after discharge for reassessment, medication and symptom review, repeat labs, blood pressure check  Contact information:  [address]  [address]  [facility] [id number] [phone]                       Contact information for after-discharge care       Destination       [facility] .    Service: Skilled Nursing  Contact information:  [address] [facility] [address] [phone]                                   Additional Instructions for the Follow-ups that You Need to Schedule       Discharge Follow-up with PCP   As directed       Currently Documented PCP:    Carmela Leija MD    PCP Phone Number:    [phone]     Follow Up Details: Within 1 week after discharge for reassessment, medication and symptom review, repeat labs, blood pressure check        Discharge Follow-up with Specialty: Orthopedic surgery   As directed      Specialty: Orthopedic surgery   Follow Up Details: Within 1 to 2 weeks after discharge or as scheduled.  Please call the office to  ensure follow-up appointment is made.            Time Spent on Discharge:  Greater than 30 minutes      DO [facility]  05/25/24  08:29 EDT

## (undated) DEVICE — DUAL CUT SAGITTAL BLADE

## (undated) DEVICE — APPL CHLORAPREP HI/LITE 26ML ORNG

## (undated) DEVICE — GLV SURG SIGNATURE ESSENTIAL PF LTX SZ8

## (undated) DEVICE — SHEET, DRAPE, SPLIT, STERILE: Brand: MEDLINE

## (undated) DEVICE — GLV SURG BIOGEL LTX PF 8

## (undated) DEVICE — SKIN PREP TRAY W/CHG: Brand: MEDLINE INDUSTRIES, INC.

## (undated) DEVICE — TRAP FLD MINIVAC MEGADYNE 100ML

## (undated) DEVICE — PENCL SMOKE/EVAC MEGADYNE TELESCP 10FT

## (undated) DEVICE — PK ANT HIP 40

## (undated) DEVICE — GLV SURG PREMIERPRO ORTHO LTX PF SZ8 BRN

## (undated) DEVICE — OPTIFOAM GENTLE SA, POSTOP, 4X8: Brand: MEDLINE

## (undated) DEVICE — SUT VIC 3/0 CTI 36IN J944H

## (undated) DEVICE — SYS CLS SKIN PREMIERPRO EXOFINFUSION 22CM

## (undated) DEVICE — DRAPE,REIN 53X77,STERILE: Brand: MEDLINE

## (undated) DEVICE — PATIENT RETURN ELECTRODE, SINGLE-USE, CONTACT QUALITY MONITORING, ADULT, WITH 9FT CORD, FOR PATIENTS WEIGING OVER 33LBS. (15KG): Brand: MEGADYNE

## (undated) DEVICE — DECANTER BAG 9": Brand: MEDLINE INDUSTRIES, INC.

## (undated) DEVICE — SOL ISO/ALC 70PCT 4OZ

## (undated) DEVICE — MAT FLR ABSORBENT LG 4FT 10 2.5FT

## (undated) DEVICE — SYRINGE,CONTROL,LL,FINGER,GRIP: Brand: MEDLINE INDUSTRIES, INC.